# Patient Record
Sex: MALE | Race: BLACK OR AFRICAN AMERICAN | NOT HISPANIC OR LATINO | Employment: UNEMPLOYED | URBAN - NONMETROPOLITAN AREA
[De-identification: names, ages, dates, MRNs, and addresses within clinical notes are randomized per-mention and may not be internally consistent; named-entity substitution may affect disease eponyms.]

---

## 2021-02-25 ENCOUNTER — HOSPITAL ENCOUNTER (OUTPATIENT)
Dept: PULMONOLOGY | Facility: HOSPITAL | Age: 41
Discharge: HOME | End: 2021-02-25
Attending: NURSE PRACTITIONER
Payer: MEDICAID

## 2021-02-25 ENCOUNTER — HOSPITAL ENCOUNTER (OUTPATIENT)
Dept: RADIOLOGY | Facility: HOSPITAL | Age: 41
Discharge: HOME | End: 2021-02-25
Attending: NURSE PRACTITIONER
Payer: MEDICAID

## 2021-02-25 DIAGNOSIS — R10.84 ABDOMINAL PAIN, GENERALIZED: ICD-10-CM

## 2021-02-25 DIAGNOSIS — R10.84 ABDOMINAL PAIN, GENERALIZED: Primary | ICD-10-CM

## 2021-02-25 PROCEDURE — 93005 ELECTROCARDIOGRAM TRACING: CPT

## 2021-02-25 PROCEDURE — 93010 ELECTROCARDIOGRAM REPORT: CPT | Mod: ,,, | Performed by: INTERNAL MEDICINE

## 2021-02-25 PROCEDURE — 74019 RADEX ABDOMEN 2 VIEWS: CPT | Mod: TC

## 2021-02-25 PROCEDURE — 93010 EKG 12-LEAD: ICD-10-PCS | Mod: ,,, | Performed by: INTERNAL MEDICINE

## 2021-04-07 DIAGNOSIS — B16.9 ACUTE FULMINATING TYPE B VIRAL HEPATITIS: Primary | ICD-10-CM

## 2021-04-09 ENCOUNTER — HOSPITAL ENCOUNTER (OUTPATIENT)
Dept: RADIOLOGY | Facility: HOSPITAL | Age: 41
Discharge: HOME | End: 2021-04-09
Attending: NURSE PRACTITIONER
Payer: MEDICAID

## 2021-04-09 DIAGNOSIS — B16.9 ACUTE FULMINATING TYPE B VIRAL HEPATITIS: ICD-10-CM

## 2021-04-09 PROCEDURE — 76700 US EXAM ABDOM COMPLETE: CPT | Mod: TC

## 2021-05-26 PROBLEM — D64.9 ANEMIA: Status: ACTIVE | Noted: 2021-05-26

## 2021-11-01 DIAGNOSIS — G47.8 OTHER SLEEP DISORDERS: Primary | ICD-10-CM

## 2021-11-02 ENCOUNTER — HOSPITAL ENCOUNTER (OUTPATIENT)
Dept: SLEEP MEDICINE | Facility: HOSPITAL | Age: 41
Discharge: HOME | End: 2021-11-02
Attending: NURSE PRACTITIONER
Payer: MEDICAID

## 2021-11-02 DIAGNOSIS — G47.8 OTHER SLEEP DISORDERS: ICD-10-CM

## 2021-11-02 PROCEDURE — 95810 POLYSOM 6/> YRS 4/> PARAM: CPT

## 2021-12-01 ENCOUNTER — HOSPITAL ENCOUNTER (OUTPATIENT)
Dept: SLEEP MEDICINE | Facility: HOSPITAL | Age: 41
Discharge: HOME | End: 2021-12-01
Attending: NURSE PRACTITIONER
Payer: MEDICAID

## 2021-12-01 PROCEDURE — 95811 POLYSOM 6/>YRS CPAP 4/> PARM: CPT

## 2023-09-20 ENCOUNTER — HOSPITAL ENCOUNTER (OUTPATIENT)
Dept: RADIOLOGY | Facility: HOSPITAL | Age: 43
Discharge: HOME | End: 2023-09-20
Attending: NURSE PRACTITIONER
Payer: MEDICAID

## 2023-09-20 DIAGNOSIS — M25.511 RIGHT SHOULDER PAIN: ICD-10-CM

## 2023-09-20 DIAGNOSIS — M25.511 RIGHT SHOULDER PAIN: Primary | ICD-10-CM

## 2023-09-20 DIAGNOSIS — M54.2 CERVICALGIA: ICD-10-CM

## 2023-09-20 PROCEDURE — 73030 X-RAY EXAM OF SHOULDER: CPT | Mod: TC,RT

## 2023-09-20 PROCEDURE — 72050 X-RAY EXAM NECK SPINE 4/5VWS: CPT | Mod: TC

## 2023-10-05 ENCOUNTER — OFFICE VISIT (OUTPATIENT)
Dept: ORTHOPEDICS | Facility: CLINIC | Age: 43
End: 2023-10-05
Payer: MEDICAID

## 2023-10-05 DIAGNOSIS — M25.511 ACUTE PAIN OF RIGHT SHOULDER: Primary | ICD-10-CM

## 2023-10-05 DIAGNOSIS — M67.911 DISORDER OF RIGHT ROTATOR CUFF: ICD-10-CM

## 2023-10-05 DIAGNOSIS — R29.898 WEAKNESS OF SHOULDER: ICD-10-CM

## 2023-10-05 PROCEDURE — 99999 PR PBB SHADOW E&M-EST. PATIENT-LVL II: CPT | Mod: PBBFAC,,, | Performed by: NURSE PRACTITIONER

## 2023-10-05 PROCEDURE — 99212 OFFICE O/P EST SF 10 MIN: CPT | Mod: PBBFAC | Performed by: NURSE PRACTITIONER

## 2023-10-05 PROCEDURE — 1160F PR REVIEW ALL MEDS BY PRESCRIBER/CLIN PHARMACIST DOCUMENTED: ICD-10-PCS | Mod: CPTII,,, | Performed by: NURSE PRACTITIONER

## 2023-10-05 PROCEDURE — 99203 PR OFFICE/OUTPT VISIT, NEW, LEVL III, 30-44 MIN: ICD-10-PCS | Mod: S$PBB,,, | Performed by: NURSE PRACTITIONER

## 2023-10-05 PROCEDURE — 1160F RVW MEDS BY RX/DR IN RCRD: CPT | Mod: CPTII,,, | Performed by: NURSE PRACTITIONER

## 2023-10-05 PROCEDURE — 99203 OFFICE O/P NEW LOW 30 MIN: CPT | Mod: S$PBB,,, | Performed by: NURSE PRACTITIONER

## 2023-10-05 PROCEDURE — 97110 THERAPEUTIC EXERCISES: CPT | Mod: ,,, | Performed by: NURSE PRACTITIONER

## 2023-10-05 PROCEDURE — 1159F MED LIST DOCD IN RCRD: CPT | Mod: CPTII,,, | Performed by: NURSE PRACTITIONER

## 2023-10-05 PROCEDURE — 97110 PR THERAPEUTIC EXERCISES: ICD-10-PCS | Mod: ,,, | Performed by: NURSE PRACTITIONER

## 2023-10-05 PROCEDURE — 99999 PR PBB SHADOW E&M-EST. PATIENT-LVL II: ICD-10-PCS | Mod: PBBFAC,,, | Performed by: NURSE PRACTITIONER

## 2023-10-05 PROCEDURE — 1159F PR MEDICATION LIST DOCUMENTED IN MEDICAL RECORD: ICD-10-PCS | Mod: CPTII,,, | Performed by: NURSE PRACTITIONER
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
## 2023-10-12 ENCOUNTER — HOSPITAL ENCOUNTER (OUTPATIENT)
Dept: RADIOLOGY | Facility: HOSPITAL | Age: 43
Discharge: HOME | End: 2023-10-12
Attending: NURSE PRACTITIONER
Payer: MEDICAID

## 2023-10-12 DIAGNOSIS — M25.511 ACUTE PAIN OF RIGHT SHOULDER: ICD-10-CM

## 2023-10-12 DIAGNOSIS — R29.898 WEAKNESS OF SHOULDER: ICD-10-CM

## 2023-10-12 DIAGNOSIS — M67.911 DISORDER OF RIGHT ROTATOR CUFF: ICD-10-CM

## 2023-10-12 PROCEDURE — 73221 MRI JOINT UPR EXTREM W/O DYE: CPT | Mod: TC,RT

## 2023-10-17 ENCOUNTER — OFFICE VISIT (OUTPATIENT)
Dept: ORTHOPEDICS | Facility: CLINIC | Age: 43
End: 2023-10-17
Payer: MEDICAID

## 2023-10-17 DIAGNOSIS — R29.898 WEAKNESS OF SHOULDER: ICD-10-CM

## 2023-10-17 DIAGNOSIS — M25.511 ACUTE PAIN OF RIGHT SHOULDER: Primary | ICD-10-CM

## 2023-10-17 DIAGNOSIS — M67.911 DISORDER OF RIGHT ROTATOR CUFF: ICD-10-CM

## 2023-10-17 PROCEDURE — 1160F PR REVIEW ALL MEDS BY PRESCRIBER/CLIN PHARMACIST DOCUMENTED: ICD-10-PCS | Mod: CPTII,,, | Performed by: NURSE PRACTITIONER

## 2023-10-17 PROCEDURE — 99999 PR PBB SHADOW E&M-EST. PATIENT-LVL II: ICD-10-PCS | Mod: PBBFAC,,, | Performed by: NURSE PRACTITIONER

## 2023-10-17 PROCEDURE — 99212 OFFICE O/P EST SF 10 MIN: CPT | Mod: PBBFAC | Performed by: NURSE PRACTITIONER

## 2023-10-17 PROCEDURE — 99999 PR PBB SHADOW E&M-EST. PATIENT-LVL II: CPT | Mod: PBBFAC,,, | Performed by: NURSE PRACTITIONER

## 2023-10-17 PROCEDURE — 99213 OFFICE O/P EST LOW 20 MIN: CPT | Mod: S$PBB,,, | Performed by: NURSE PRACTITIONER

## 2023-10-17 PROCEDURE — 99213 PR OFFICE/OUTPT VISIT, EST, LEVL III, 20-29 MIN: ICD-10-PCS | Mod: S$PBB,,, | Performed by: NURSE PRACTITIONER

## 2023-10-17 PROCEDURE — 1160F RVW MEDS BY RX/DR IN RCRD: CPT | Mod: CPTII,,, | Performed by: NURSE PRACTITIONER

## 2023-10-17 PROCEDURE — 1159F PR MEDICATION LIST DOCUMENTED IN MEDICAL RECORD: ICD-10-PCS | Mod: CPTII,,, | Performed by: NURSE PRACTITIONER

## 2023-10-17 PROCEDURE — 1159F MED LIST DOCD IN RCRD: CPT | Mod: CPTII,,, | Performed by: NURSE PRACTITIONER

## 2023-10-17 NOTE — PROGRESS NOTES
Subjective:      Follow up: MRI RT shoulder;    Moreno Berman is a 43 y.o. left handed male who presents for follow up for chronic right shoulder pain, weakness and inability to lift the right arm. He is here for MRI review. Previous injury was on 01/31/22. He presents today and rates his pain as 5/10 and has bouts of upper extremity numbness. He states that he is unable to lift the arm and feels very weak as previous. Symptoms are aggravated by lifting, repetitive use and attempted over head motions. He reports mild right hand grasp weakness.      The following portions of the patient's history were reviewed and updated as appropriate: allergies, current medications, past family history, past medical history, past social history, past surgical history, and problem list.      Review of Systems   Constitutional: Negative.  Negative for fever.   Musculoskeletal:  Positive for joint pain.   Skin: Negative.    Neurological: Negative.    Psychiatric/Behavioral: Anxiety        Objective:   NVI  General:  alert, appears stated age, and cooperative   Gait:  Normal.       Right Shoulder  Bruising:   absent   Crepitus:  absent   Joint Tenderness:  lateral acromial, rotator cuff and deltoid.    Active ROM:   Decreased and painful in all planes.   FF < 60 AROM.   Very limited AROM with IR and ER.   Neer:   positive   Nowak  positive   Speeds negative   Cross arm negative   Empty can positive   Drop arm guarded   Stability:   normal   Apprehension Sign:   negative   Atrophy:   none noted   Strength:  biceps 4/5, triceps 4/5, abduction 3/5, adduction 3/5   Mild RT sided hand grasp weakness noted.   Contralateral shoulder was without deficit.   Brisk refill noted.       Imaging  X-Ray: Reviewed of the right shoulder 09/20/23  No evidence of a fracture or dislocation     MR shoulder RT:   Impression:     Partial-thickness tear of the supraspinatus tendon at the humeral head without tendon retraction or muscle atrophy  detected.     Assessment:      RT shoulder pain, partial tear supraspinatus  RT upper extremity weakness     Plan:      MRI RT Shoulder was reviewed, consistent with a partial-thickness tear of the supraspinatus tendon   Continue Home physician directed RT shoulder HEP and referral to OT for modalities as directed.   Mobic as previous.   Ice and over head rest as directed.  RTC 6 weeks for follow up. Will make referral to sports medicine if needed. Also will make referral to neurology if any numbness in the RT UE persists.  He had no further questions.

## 2023-10-23 ENCOUNTER — TELEPHONE (OUTPATIENT)
Dept: REHABILITATION | Facility: HOSPITAL | Age: 43
End: 2023-10-23
Payer: MEDICAID

## 2023-10-24 ENCOUNTER — CLINICAL SUPPORT (OUTPATIENT)
Dept: REHABILITATION | Facility: HOSPITAL | Age: 43
End: 2023-10-24
Attending: NURSE PRACTITIONER
Payer: MEDICAID

## 2023-10-24 DIAGNOSIS — M25.511 ACUTE PAIN OF RIGHT SHOULDER: ICD-10-CM

## 2023-10-24 DIAGNOSIS — M67.911 DISORDER OF RIGHT ROTATOR CUFF: ICD-10-CM

## 2023-10-24 DIAGNOSIS — R29.898 WEAKNESS OF SHOULDER: ICD-10-CM

## 2023-10-24 PROCEDURE — 97166 OT EVAL MOD COMPLEX 45 MIN: CPT

## 2023-10-24 PROCEDURE — 97530 THERAPEUTIC ACTIVITIES: CPT
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
## 2023-10-25 ENCOUNTER — CLINICAL SUPPORT (OUTPATIENT)
Dept: REHABILITATION | Facility: HOSPITAL | Age: 43
End: 2023-10-25
Payer: MEDICAID

## 2023-10-25 DIAGNOSIS — M25.511 ACUTE PAIN OF RIGHT SHOULDER: Primary | ICD-10-CM

## 2023-10-25 DIAGNOSIS — M67.911 DISORDER OF RIGHT ROTATOR CUFF: ICD-10-CM

## 2023-10-25 DIAGNOSIS — R29.898 WEAKNESS OF SHOULDER: ICD-10-CM

## 2023-10-25 PROCEDURE — 97530 THERAPEUTIC ACTIVITIES: CPT

## 2023-10-25 NOTE — PROGRESS NOTES
Occupational Therapy Daily Treatment Note     Date: 10/25/2023  Name: Moreno Berman  MRN: [id number]    Diagnosis:   Encounter Diagnoses   Name Primary?    Acute pain of right shoulder Yes    Disorder of right rotator cuff     Weakness of shoulder      Referring Physician: Akin Adams NP    Evaluation Date: 10/24/2023  Plan of Care Certification Period: 10/24/2023 - 12/8/2023    Visit # / Visits authorized: 1 / 12  GALAVIZ visit number: 0  Time In: 10:00  Time Out: 10:53  Total Billable Time: 53 minutes    Precautions:  Standard      Subjective     Pt reports: I'm hurting today  he was compliant with home exercise program given last session.     Pain: 8/10  Location: right shoulder      Objective     Treatment consist of the following:     Moreno received the following supervised modalities after being cleared for contradictions:   -cp post session to decrease pain     Moreno participated in dynamic functional therapeutic activities to improve functional performance, including:  -Patient engaged in OH pulley in shoulder flexion and abduction ex to increase ROM in bilateral shoulders for increase indep with dressing, grooming, and bathing ADL tasks.     -ball roll outs    -Patient completed 2 sets x 10 reps performing bilateral lateral, horizonal, and circumduction towel slides on tabletop seated to improve strength, endurance, and fx mobility for increase indep while completing ADL/IADL tasks.     -scalene stretches x 5 with 10 sec holds for possible nerve entrapment     Home Exercises and Education Provided     Education provided:   - Role of OT and OT POC  - Progress towards goals     Written Home Exercises Provided: Patient instructed to cont prior HEP.  Exercises were reviewed and Moreno was able to demonstrate them prior to the end of the session.  Moreno demonstrated good  understanding of the HEP provided.   .   See EMR under Media for exercises provided 10/24/2023.        Assessment     Pt would continue to  benefit from skilled OT. Pt tolerated therapy well this day with mod complaints of increased pain during stretching     Moreno is progressing well towards his goals and there are no updates to goals at this time. Pt prognosis is Good.     Pt will continue to benefit from skilled OT intervention.    Patient continues to demonstrate limitation  with  ROM, Joint mobility, Stiffness, Decreased functional use, Impaired sensation, Decreased strength, and Continued pain     Goals:  Goals to be met in 6 weeks:  1) Pt will be independent and report performing HEP to maximize (R) shoulder functional capacity.   2) Pt will increase shoulder strength in all measured planes of motion to 4/5 with daily task.  3) Pt will report use of home modalities for pain management.  4) Pt will report one degree less of pain with nonuse.  5) Pt will increase ability to move/handle objects with an increased score on FOTO >=67 (currently 55)  6) Pt will increase shoulder AROM in all measured planes of motion by 5-10 degrees with daily task.   7) Pt will report increased independence in ADLs.       Plan     Moreno to be treated by Occupational Therapy 2 times per week for 6 weeks to achieve the established goals.   Treatment to include Ultrasoud @ 3mHz, AAROM Mobilization of GH joint, PROM Home program, Ice, Moist heat, Strengthening Theraband Ex, UBE , and E- stim as well as any other treatments deemed necessary based on the patient's needs or progress.      Certification Dates: 10/24/2023 - 12/8/2023    Updates/Grading for next session: N/A      Curtis Bravo OTR/L

## 2023-11-01 ENCOUNTER — CLINICAL SUPPORT (OUTPATIENT)
Dept: REHABILITATION | Facility: HOSPITAL | Age: 43
End: 2023-11-01
Payer: MEDICAID

## 2023-11-01 DIAGNOSIS — R29.898 WEAKNESS OF SHOULDER: ICD-10-CM

## 2023-11-01 DIAGNOSIS — M25.511 ACUTE PAIN OF RIGHT SHOULDER: Primary | ICD-10-CM

## 2023-11-01 DIAGNOSIS — M67.911 DISORDER OF RIGHT ROTATOR CUFF: ICD-10-CM

## 2023-11-01 PROCEDURE — 97530 THERAPEUTIC ACTIVITIES: CPT | Mod: CO

## 2023-11-01 NOTE — PROGRESS NOTES
Occupational Therapy Daily Treatment Note     Date: 11/1/2023  Name: Moreno Berman  MRN: [id number]    Diagnosis:   Encounter Diagnoses   Name Primary?    Acute pain of right shoulder Yes    Disorder of right rotator cuff     Weakness of shoulder      Referring Physician: Akin Adams NP    Evaluation Date: 10/24/2023  Plan of Care Certification Period: 10/24/2023 - 12/8/2023    Visit # / Visits authorized: 2 / 12  GALAVIZ visit number: 1  Time In: 10:00 AM  Time Out: 10:57 AM   Total Billable Time: 57  Precautions:  Standard      Subjective     Pt reports: Its hurting today.  he was compliant with home exercise program given last session.     Pain: 9/10  Location: right shoulder      Objective     Treatment consist of the following:     Moreno received the following supervised modalities after being cleared for contradictions:   -cp post session to decrease pain (not today)    Moreno participated in dynamic functional therapeutic activities to improve functional performance, including:  -Patient engaged in OH pulley in shoulder flexion and scaption exercise x 2 min each way to increase ROM in bilateral shoulders for increase indep with dressing, grooming, and bathing ADL tasks.     -Patient completed 2 sets x 10 reps performing shoulder flexion, scaption, and abduction towel slides on tabletop seated to improve strength, endurance, and fx mobility for increase indep while completing ADL/IADL tasks.     - Pendulums in shoulder flexion and abduction x 1 min each way to increase blood flow to hand to decrease numbness.     Home Exercises and Education Provided     Education provided:   - Role of OT and OT POC  - Progress towards goals     Written Home Exercises Provided: Patient instructed to cont prior HEP.  Exercises were reviewed and Moreno was able to demonstrate them prior to the end of the session.  Moreno demonstrated good  understanding of the HEP provided.   .   See EMR under Media for exercises provided  10/24/2023.        Assessment     Patient would continue to benefit from skilled occupational therapy to increase pain free range of motion. Patient reported significant amount pain prior to session. Session this day worked on decreasing pain. Patient also reported RUE was numb t/o session. Patient reported numbness decreased while completing pendulums. Patient reported he did not want cryotherapy post session this day. Patient reported he has been compliant with home exercise program. Patient reported pain was 7 /10 post session. Occupational therapist /GALAVIZ collaboration to discuss POC, improvements, and d/c planning on todays date.      Moreno is progressing well towards his goals and there are no updates to goals at this time. Pt prognosis is Good.     Pt will continue to benefit from skilled OT intervention.    Patient continues to demonstrate limitation  with  ROM, Joint mobility, Stiffness, Decreased functional use, Impaired sensation, Decreased strength, and Continued pain     Goals:  Goals to be met in 6 weeks:  1) Pt will be independent and report performing HEP to maximize (R) shoulder functional capacity.   2) Pt will increase shoulder strength in all measured planes of motion to 4/5 with daily task.  3) Pt will report use of home modalities for pain management.  4) Pt will report one degree less of pain with nonuse.  5) Pt will increase ability to move/handle objects with an increased score on FOTO >=67 (currently 55)  6) Pt will increase shoulder AROM in all measured planes of motion by 5-10 degrees with daily task.   7) Pt will report increased independence in ADLs.       Plan     Moreno to be treated by Occupational Therapy 2 times per week for 6 weeks to achieve the established goals.   Treatment to include Ultrasoud @ 3mHz, AAROM Mobilization of GH joint, PROM Home program, Ice, Moist heat, Strengthening Theraband Ex, UBE , and E- stim as well as any other treatments deemed necessary based on the  patient's needs or progress.      Certification Dates: 10/24/2023 - 12/8/2023    Updates/Grading for next session: N/A      BETH Wheat

## 2023-11-03 ENCOUNTER — CLINICAL SUPPORT (OUTPATIENT)
Dept: REHABILITATION | Facility: HOSPITAL | Age: 43
End: 2023-11-03
Payer: MEDICAID

## 2023-11-03 DIAGNOSIS — M25.511 ACUTE PAIN OF RIGHT SHOULDER: Primary | ICD-10-CM

## 2023-11-03 PROCEDURE — 97530 THERAPEUTIC ACTIVITIES: CPT

## 2023-11-03 NOTE — PROGRESS NOTES
Occupational Therapy Daily Treatment Note     Date: 11/3/2023  Name: Moreno Berman  MRN: [id number]    Diagnosis:   Encounter Diagnosis   Name Primary?    Acute pain of right shoulder Yes       Referring Physician: Akin Adams NP    Evaluation Date: 10/24/2023  Plan of Care Certification Period: 10/24/2023 - 12/8/2023    Visit # / Visits authorized: 3 / 12  GAL[facility] visit number: 0  Time In: 10:00 AM  Time Out: 11:00 AM   Total Billable Time: 60  Precautions:  Standard      Subjective     Pt reports: Cherry smith.  he was compliant with home exercise program given last session.     Pain: 5/10  Location: right shoulder      Objective     Treatment consist of the following:     Moreno received the following supervised modalities after being cleared for contradictions:   -cp post session to decrease pain     Moreno participated in dynamic functional therapeutic activities to improve functional performance, including:  -Patient engaged in OH pulley in shoulder flexion and scaption exercise x 2 min each way to increase ROM in bilateral shoulders for increase indep with dressing, grooming, and bathing ADL tasks.     -Patient performed bilateral upper body ergonomic exercise for 1 sets x 10 min with min rest breaks to facilitate an increase in strength, functional mobility, range of motion, and endurance for increased indep, cardiopulmonary functions, and performance with activities of daily living.       -Patient completed 2 sets x 10 reps performing shoulder flexion, scaption, and abduction towel slides on tabletop seated to improve strength, endurance, and fx mobility for increase indep while completing ADL/IADL tasks.     - Pendulums in shoulder flexion and abduction x 1 min each way to increase blood flow to hand to decrease numbness.     -Patient completed 2 sets x 10 reps performing finger ladder in shoulder flexion and abduction on incline slope to improve strength, range of motion, endurance, and functional  mobility for increase indep while completing ADL/IADL tasks.      Home Exercises and Education Provided     Education provided:   - Role of OT and OT POC  - Progress towards goals     Written Home Exercises Provided: Patient instructed to cont prior HEP.  Exercises were reviewed and Moreno was able to demonstrate them prior to the end of the session.  Moreno demonstrated good  understanding of the HEP provided.   .   See EMR under Media for exercises provided 10/24/2023.        Assessment     Patient would continue to benefit from skilled occupational therapy to increase pain free range of motion. Pt was able to complete all new ax this day with min rest breaks and min v/c for proper form and positioning. All ax this day focused on improving ROM and strength in shoulder. Patient reported he has been compliant with home exercise program. Pt with decreased pain post cp. Occupational therapist /GAL[facility] collaboration to discuss POC, improvements, and d/c planning on todays date.      Moreno is progressing well towards his goals and there are no updates to goals at this time. Pt prognosis is Good.     Pt will continue to benefit from skilled OT intervention.    Patient continues to demonstrate limitation  with  ROM, Joint mobility, Stiffness, Decreased functional use, Impaired sensation, Decreased strength, and Continued pain     Goals:  Goals to be met in 6 weeks:  1) Pt will be independent and report performing HEP to maximize (R) shoulder functional capacity.   2) Pt will increase shoulder strength in all measured planes of motion to 4/5 with daily task.  3) Pt will report use of home modalities for pain management.  4) Pt will report one degree less of pain with nonuse.  5) Pt will increase ability to move/handle objects with an increased score on FOTO >=67 (currently 55)  6) Pt will increase shoulder AROM in all measured planes of motion by 5-10 degrees with daily task.   7) Pt will report increased independence in ADLs.        Plan     Moreno to be treated by Occupational Therapy 2 times per week for 6 weeks to achieve the established goals.   Treatment to include Ultrasoud @ 3mHz, AAROM Mobilization of GH joint, PROM Home program, Ice, Moist heat, Strengthening Theraband Ex, UBE , and E- stim as well as any other treatments deemed necessary based on the patient's needs or progress.      Certification Dates: 10/24/2023 - 12/8/2023    Updates/Grading for next session: N/A      Curtis Bravo OT

## 2023-11-08 ENCOUNTER — CLINICAL SUPPORT (OUTPATIENT)
Dept: REHABILITATION | Facility: HOSPITAL | Age: 43
End: 2023-11-08
Payer: MEDICAID

## 2023-11-08 DIAGNOSIS — M67.911 DISORDER OF RIGHT ROTATOR CUFF: ICD-10-CM

## 2023-11-08 DIAGNOSIS — M25.511 ACUTE PAIN OF RIGHT SHOULDER: Primary | ICD-10-CM

## 2023-11-08 PROCEDURE — 97530 THERAPEUTIC ACTIVITIES: CPT | Mod: CO

## 2023-11-08 NOTE — PROGRESS NOTES
Occupational Therapy Daily Treatment Note     Date: 11/8/2023  Name: Moreno Berman  MRN: [id number]    Diagnosis:   Encounter Diagnoses   Name Primary?    Acute pain of right shoulder Yes    Disorder of right rotator cuff        Referring Physician: Akin Adams NP    Evaluation Date: 10/24/2023  Plan of Care Certification Period: 10/24/2023 - 12/8/2023    Visit # / Visits authorized: 4 / 12  GAL[facility] visit number: 1  Time In: 10:00 AM  Time Out: 10:58 AM   Total Billable Time: 58 min  Precautions:  Standard      Subjective     Pt reports: Im making it.  he was compliant with home exercise program given last session.     Pain: 7/10  Location: right shoulder      Objective     Treatment consist of the following:     Moreno received the following supervised modalities after being cleared for contradictions:   - Applied MHP to (L) shoulder to increase muscle elasticity and improve blood flow prior to beginning PROM and therapeutic interventions     Moreno participated in dynamic functional therapeutic activities to improve functional performance, including:  - Patient engaged in bilateral hand gripper ex with mod resistance for 2 sets x 30 reps seated to improve overall hand strength while completing ADL/IADL tasks. Patient needed rest breaks b/w sets.  Patient completed while tolerating MHP.     - Patient engaged in bilateral upper extrimity exercise completing 2 sets x 15 reps of scapular ret/protraction, wrist flex/extension, and supination/pronation using green Flexbar with seated to faciliate an increase in indep while completing activity of daily living tasks. Patient needed seated rest breaks b/w each set.  Patient completed while tolerating MHP.     -Patient engaged in OH pulley in shoulder flexion and scaption exercise x 2 min each way to increase ROM in bilateral shoulders for increase indep with dressing, grooming, and bathing ADL tasks.     -Patient performed bilateral upper body ergonomic exercise for 1  sets x 8 min with min rest breaks to facilitate an increase in strength, functional mobility, range of motion, and endurance for increased indep, cardiopulmonary functions, and performance with activities of daily living.     -Patient completed 2 sets x 10 reps performing shoulder flexion, scaption, and abduction towel slides on tabletop seated to improve strength, endurance, and fx mobility for increase indep while completing ADL/IADL tasks.     - Pendulums in shoulder flexion and abduction x 1 min each way to increase blood flow to hand to decrease numbness.     -Patient completed 2 sets x 10 reps performing finger ladder in shoulder flexion and abduction on incline slope to improve strength, range of motion, endurance, and functional mobility for increase indep while completing ADL/IADL tasks.      Home Exercises and Education Provided     Education provided:   - Role of OT and OT POC  - Progress towards goals     Written Home Exercises Provided: Patient instructed to cont prior HEP.  Exercises were reviewed and Moreno was able to demonstrate them prior to the end of the session.  Moreno demonstrated good  understanding of the HEP provided.   .   See EMR under Media for exercises provided 10/24/2023.        Assessment     Patient would continue to benefit from skilled occupational therapy to increase pain free range of motion. Patient reported more tingling and numbness pain today in hand not shoulder. Patient continued to report decrease pain when completing pendulums. MHP applied prior to session while completing therapeutic exercise to assist with decrease pain and lossen musculature. Patient requested Biofreeze post session to decrease pain and soreness post session. Occupational therapist /[facility] collaboration to discuss POC, improvements, and d/c planning on todays date.      Moreno is progressing well towards his goals and there are no updates to goals at this time. Pt prognosis is Good.     Pt will continue to  benefit from skilled OT intervention.  Patient continues to demonstrate limitation  with  ROM, Joint mobility, Stiffness, Decreased functional use, Impaired sensation, Decreased strength, and Continued pain     Goals:  Goals to be met in 6 weeks:  1) Pt will be independent and report performing HEP to maximize (R) shoulder functional capacity.   2) Pt will increase shoulder strength in all measured planes of motion to 4/5 with daily task.  3) Pt will report use of home modalities for pain management.  4) Pt will report one degree less of pain with nonuse.  5) Pt will increase ability to move/handle objects with an increased score on FOTO >=67 (currently 55)  6) Pt will increase shoulder AROM in all measured planes of motion by 5-10 degrees with daily task.   7) Pt will report increased independence in ADLs.       Plan     Moreno to be treated by Occupational Therapy 2 times per week for 6 weeks to achieve the established goals.   Treatment to include Ultrasoud @ 3mHz, AAROM Mobilization of GH joint, PROM Home program, Ice, Moist heat, Strengthening Theraband Ex, UBE , and E- stim as well as any other treatments deemed necessary based on the patient's needs or progress.      Certification Dates: 10/24/2023 - 12/8/2023    Updates/Grading for next session: N/A      BETH Wheat

## 2023-11-10 ENCOUNTER — CLINICAL SUPPORT (OUTPATIENT)
Dept: REHABILITATION | Facility: HOSPITAL | Age: 43
End: 2023-11-10
Payer: MEDICAID

## 2023-11-10 DIAGNOSIS — M25.511 ACUTE PAIN OF RIGHT SHOULDER: Primary | ICD-10-CM

## 2023-11-10 PROCEDURE — 97530 THERAPEUTIC ACTIVITIES: CPT | Mod: CO

## 2023-11-10 NOTE — PROGRESS NOTES
Occupational Therapy Daily Treatment Note     Date: 11/10/2023  Name: Moreno Berman  MRN: [id number]    Diagnosis:   Encounter Diagnosis   Name Primary?    Acute pain of right shoulder Yes       Referring Physician: Akin Adams NP    Evaluation Date: 10/24/2023  Plan of Care Certification Period: 10/24/2023 - 12/8/2023    Visit # / Visits authorized: 4 / 12  GAL[facility] visit number: 1  Time In: 9:55 AM  Time Out: 10:55 AM   Total Billable Time: 60 min  Precautions:  Standard      Subjective     Pt reports: Its staying numb.   he was compliant with home exercise program given last session.     Pain: 0/10  Location: right shoulder      Objective     Treatment consist of the following:     Moreno received the following supervised modalities after being cleared for contradictions:   - Biofreeze post session to decrease pain and soreness post session.     Moreno participated in dynamic functional therapeutic activities to improve functional performance, including:  - Patient engaged in bilateral hand gripper ex with mod resistance for 2 sets x 30 reps seated to improve overall hand strength while completing ADL/IADL tasks. Patient needed rest breaks b/w sets.  Patient completed while tolerating MHP.     - Patient engaged in bilateral upper extrimity exercise completing 2 sets x 15 reps of scapular ret/protraction, wrist flex/extension, and supination/pronation using green Flexbar with seated to faciliate an increase in indep while completing activity of daily living tasks. Patient needed seated rest breaks b/w each set.  Patient completed while tolerating MHP.     -Patient engaged in OH pulley in shoulder flexion and scaption exercise x 2 min each way to increase ROM in bilateral shoulders for increase indep with dressing, grooming, and bathing ADL tasks.     -Patient performed bilateral upper body ergonomic exercise for 1 sets x 8 min with min rest breaks to facilitate an increase in strength, functional mobility,  range of motion, and endurance for increased indep, cardiopulmonary functions, and performance with activities of daily living.     -Patient completed 2 sets x 10 reps performing shoulder flexion, scaption, and abduction towel slides on tabletop seated to improve strength, endurance, and fx mobility for increase indep while completing ADL/IADL tasks.     - Pendulums in shoulder flexion and abduction x 1 min each way to increase blood flow to hand to decrease numbness.     -Patient completed 2 sets x 10 reps performing finger ladder in shoulder flexion and abduction on incline slope to improve strength, range of motion, endurance, and functional mobility for increase indep while completing ADL/IADL tasks.      Home Exercises and Education Provided     Education provided:   - Role of OT and OT POC  - Progress towards goals     Written Home Exercises Provided: Patient instructed to cont prior HEP.  Exercises were reviewed and Moreno was able to demonstrate them prior to the end of the session.  Moreno demonstrated good  understanding of the HEP provided.   .   See EMR under Media for exercises provided 10/24/2023.        Assessment     Patient would continue to benefit from skilled occupational therapy to increase pain free range of motion and decrease numbness and tingling. Patient reported no pain this day just numbness and tingling. Patient educated on way to decrease numbness at home and with IADLs. Patient requested Biofreeze post session to decrease pain and soreness post session. Occupational therapist /[facility] collaboration to discuss POC, improvements, and d/c planning on todays date.      Moreno is progressing well towards his goals and there are no updates to goals at this time. Pt prognosis is Good.     Pt will continue to benefit from skilled OT intervention.  Patient continues to demonstrate limitation  with  ROM, Joint mobility, Stiffness, Decreased functional use, Impaired sensation, Decreased strength, and  Continued pain     Goals:  Goals to be met in 6 weeks:  1) Pt will be independent and report performing HEP to maximize (R) shoulder functional capacity.   2) Pt will increase shoulder strength in all measured planes of motion to 4/5 with daily task.  3) Pt will report use of home modalities for pain management.  4) Pt will report one degree less of pain with nonuse.  5) Pt will increase ability to move/handle objects with an increased score on FOTO >=67 (currently 55)  6) Pt will increase shoulder AROM in all measured planes of motion by 5-10 degrees with daily task.   7) Pt will report increased independence in ADLs.       Plan     Moreno to be treated by Occupational Therapy 2 times per week for 6 weeks to achieve the established goals.   Treatment to include Ultrasoud @ 3mHz, AAROM Mobilization of GH joint, PROM Home program, Ice, Moist heat, Strengthening Theraband Ex, UBE , and E- stim as well as any other treatments deemed necessary based on the patient's needs or progress.      Certification Dates: 10/24/2023 - 12/8/2023    Updates/Grading for next session: N/A      ANASTACIA Wheat/IAIN

## 2023-11-15 ENCOUNTER — CLINICAL SUPPORT (OUTPATIENT)
Dept: REHABILITATION | Facility: HOSPITAL | Age: 43
End: 2023-11-15
Payer: MEDICAID

## 2023-11-15 DIAGNOSIS — M25.511 ACUTE PAIN OF RIGHT SHOULDER: Primary | ICD-10-CM

## 2023-11-15 PROCEDURE — 97530 THERAPEUTIC ACTIVITIES: CPT

## 2023-11-15 NOTE — PROGRESS NOTES
Occupational Therapy Daily Treatment Note     Date: 11/15/2023  Name: Moreno Berman  MRN: [id number]    Diagnosis:   Encounter Diagnosis   Name Primary?    Acute pain of right shoulder Yes         Referring Physician: Akin Adams NP    Evaluation Date: 10/24/2023  Plan of Care Certification Period: 10/24/2023 - 12/8/2023    Visit # / Visits authorized: 6 / 12  GAL[facility] visit number: 0  Time In: 10:00 AM  Time Out: 10:58 AM   Total Billable Time: 58 min  Precautions:  Standard      Subjective     Pt reports: I'm ok.   he was compliant with home exercise program given last session.     Pain: 0/10  Location: right shoulder      Objective     Treatment consist of the following:     Moreno received the following supervised modalities after being cleared for contradictions:   - Biofreeze post session to decrease pain and soreness post session.     Moreno participated in dynamic functional therapeutic activities to improve functional performance, including:  - Patient engaged in bilateral hand gripper ex with mod resistance for 2 sets x 30 reps seated to improve overall hand strength while completing ADL/IADL tasks. Patient needed rest breaks b/w sets.  Patient completed while tolerating MHP.     - Patient engaged in bilateral upper extrimity exercise completing 2 sets x 15 reps of scapular ret/protraction, wrist flex/extension, and supination/pronation using green Flexbar with seated to faciliate an increase in indep while completing activity of daily living tasks. Patient needed seated rest breaks b/w each set.  Patient completed while tolerating MHP.     -Patient engaged in OH pulley in shoulder flexion and scaption exercise x 2 min each way to increase ROM in bilateral shoulders for increase indep with dressing, grooming, and bathing ADL tasks.     -Patient performed bilateral upper body ergonomic exercise for 1 sets x 8 min with min rest breaks to facilitate an increase in strength, functional mobility, range of  motion, and endurance for increased indep, cardiopulmonary functions, and performance with activities of daily living.     -Patient completed 2 sets x 10 reps performing shoulder flexion, scaption, and abduction towel slides on tabletop seated to improve strength, endurance, and fx mobility for increase indep while completing ADL/IADL tasks.     - Pendulums in shoulder flexion and abduction x 1 min each way to increase blood flow to hand to decrease numbness.     -Patient completed 2 sets x 10 reps performing finger ladder in shoulder flexion and abduction on incline slope to improve strength, range of motion, endurance, and functional mobility for increase indep while completing ADL/IADL tasks.      Home Exercises and Education Provided     Education provided:   - Role of OT and OT POC  - Progress towards goals     Written Home Exercises Provided: Patient instructed to cont prior HEP.  Exercises were reviewed and Moreno was able to demonstrate them prior to the end of the session.  Moreno demonstrated good  understanding of the HEP provided.   .   See EMR under Media for exercises provided 10/24/2023.        Assessment     Patient would continue to benefit from skilled occupational therapy to increase pain free range of motion and decrease numbness and tingling. Patient reported no pain this day just numbness and tingling. Patient stated that he seen his PCP about his numbness. Patient requested Biofreeze post session to decrease pain and soreness post session. Occupational therapist /[facility] collaboration to discuss POC, improvements, and d/c planning on todays date.      Moreno is progressing well towards his goals and there are no updates to goals at this time. Pt prognosis is Good.     Pt will continue to benefit from skilled OT intervention.  Patient continues to demonstrate limitation  with  ROM, Joint mobility, Stiffness, Decreased functional use, Impaired sensation, Decreased strength, and Continued pain      Goals:  Goals to be met in 6 weeks:  1) Pt will be independent and report performing HEP to maximize (R) shoulder functional capacity.   2) Pt will increase shoulder strength in all measured planes of motion to 4/5 with daily task.  3) Pt will report use of home modalities for pain management.  4) Pt will report one degree less of pain with nonuse.  5) Pt will increase ability to move/handle objects with an increased score on FOTO >=67 (currently 55)  6) Pt will increase shoulder AROM in all measured planes of motion by 5-10 degrees with daily task.   7) Pt will report increased independence in ADLs.       Plan     Moreno to be treated by Occupational Therapy 2 times per week for 6 weeks to achieve the established goals.   Treatment to include Ultrasoud @ 3mHz, AAROM Mobilization of GH joint, PROM Home program, Ice, Moist heat, Strengthening Theraband Ex, UBE , and E- stim as well as any other treatments deemed necessary based on the patient's needs or progress.      Certification Dates: 10/24/2023 - 12/8/2023    Updates/Grading for next session: N/A      Curtis Bravo OT

## 2023-11-17 ENCOUNTER — CLINICAL SUPPORT (OUTPATIENT)
Dept: REHABILITATION | Facility: HOSPITAL | Age: 43
End: 2023-11-17
Payer: MEDICAID

## 2023-11-17 DIAGNOSIS — M67.911 DISORDER OF RIGHT ROTATOR CUFF: ICD-10-CM

## 2023-11-17 DIAGNOSIS — M25.511 ACUTE PAIN OF RIGHT SHOULDER: Primary | ICD-10-CM

## 2023-11-17 PROCEDURE — 97530 THERAPEUTIC ACTIVITIES: CPT | Mod: CO

## 2023-11-17 NOTE — PROGRESS NOTES
"  Occupational Therapy Daily Treatment Note     Date: 11/17/2023  Name: Moreno Berman  MRN: [id number]    Diagnosis:   Encounter Diagnoses   Name Primary?    Acute pain of right shoulder Yes    Disorder of right rotator cuff          Referring Physician: Akin Adams NP    Evaluation Date: 10/24/2023  Plan of Care Certification Period: 10/24/2023 - 12/8/2023    Visit # / Visits authorized: 6 / 12  GAL[facility] visit number: 0  Time In: 10:00 AM  Time Out: 10:56 AM   Total Billable Time: 56 min  Precautions:  Standard      Subjective     Pt reports: "Just a little bit of pain."   he was compliant with home exercise program given last session.     Pain: 7/10  Location: right shoulder      Objective     Treatment consist of the following:     Moreno received the following supervised modalities after being cleared for contradictions:   - Biofreeze post session to decrease pain and soreness post session.     Moreno participated in dynamic functional therapeutic activities to improve functional performance, including:  - Patient engaged in bilateral hand gripper ex with mod resistance for 2 sets x 30 reps seated to improve overall hand strength while completing ADL/IADL tasks. Patient needed rest breaks b/w sets.  Patient completed while tolerating MHP.     - Patient engaged in bilateral upper extrimity exercise completing 2 sets x 15 reps of scapular ret/protraction, wrist flex/extension, and supination/pronation using green Flexbar with seated to faciliate an increase in indep while completing activity of daily living tasks. Patient needed seated rest breaks b/w each set.  Patient completed while tolerating MHP.     -Patient engaged in OH pulley in shoulder flexion and scaption exercise x 3 min each way to increase ROM in bilateral shoulders for increase indep with dressing, grooming, and bathing ADL tasks.     -Patient performed bilateral upper body ergonomic exercise for 1 sets x 8 min with min rest breaks to facilitate " an increase in strength, functional mobility, range of motion, and endurance for increased indep, cardiopulmonary functions, and performance with activities of daily living.     -Patient completed 2 sets x 10 reps performing shoulder flexion, scaption, and abduction towel slides on tabletop seated to improve strength, endurance, and fx mobility for increase indep while completing ADL/IADL tasks.     - Pendulums in shoulder flexion and abduction x 1 min each way to increase blood flow to hand to decrease numbness.     -Patient completed 2 sets x 15 reps performing finger ladder in shoulder flexion and abduction on incline slope to improve strength, range of motion, endurance, and functional mobility for increase indep while completing ADL/IADL tasks.      Home Exercises and Education Provided     Education provided:   - Role of OT and OT POC  - Progress towards goals     Written Home Exercises Provided: Patient instructed to cont prior HEP.  Exercises were reviewed and Moreno was able to demonstrate them prior to the end of the session.  Moreno demonstrated good  understanding of the HEP provided.   .   See EMR under Media for exercises provided 10/24/2023.        Assessment     Patient would continue to benefit from skilled occupational therapy to increase pain free range of motion and decrease numbness and tingling. Patient reported less pain this day just numbness and tingling. Patient reported pain stayed about at a 7.  Occupational therapist /GALAVIZ collaboration to discuss POC, improvements, and d/c planning on todays date.      Moreno is progressing well towards his goals and there are no updates to goals at this time. Pt prognosis is Good.     Pt will continue to benefit from skilled OT intervention.  Patient continues to demonstrate limitation  with  ROM, Joint mobility, Stiffness, Decreased functional use, Impaired sensation, Decreased strength, and Continued pain     Goals:  Goals to be met in 6 weeks:  1) Pt will  be independent and report performing HEP to maximize (R) shoulder functional capacity.   2) Pt will increase shoulder strength in all measured planes of motion to 4/5 with daily task.  3) Pt will report use of home modalities for pain management.  4) Pt will report one degree less of pain with nonuse.  5) Pt will increase ability to move/handle objects with an increased score on FOTO >=67 (currently 55)  6) Pt will increase shoulder AROM in all measured planes of motion by 5-10 degrees with daily task.   7) Pt will report increased independence in ADLs.       Plan     Moreno to be treated by Occupational Therapy 2 times per week for 6 weeks to achieve the established goals.   Treatment to include Ultrasoud @ 3mHz, AAROM Mobilization of GH joint, PROM Home program, Ice, Moist heat, Strengthening Theraband Ex, UBE , and E- stim as well as any other treatments deemed necessary based on the patient's needs or progress.      Certification Dates: 10/24/2023 - 12/8/2023    Updates/Grading for next session: N/A      BETH Wheat

## 2023-11-22 ENCOUNTER — CLINICAL SUPPORT (OUTPATIENT)
Dept: REHABILITATION | Facility: HOSPITAL | Age: 43
End: 2023-11-22
Payer: MEDICAID

## 2023-11-22 DIAGNOSIS — R29.898 WEAKNESS OF SHOULDER: ICD-10-CM

## 2023-11-22 DIAGNOSIS — M67.911 DISORDER OF RIGHT ROTATOR CUFF: ICD-10-CM

## 2023-11-22 DIAGNOSIS — M25.511 ACUTE PAIN OF RIGHT SHOULDER: Primary | ICD-10-CM

## 2023-11-22 PROCEDURE — 97530 THERAPEUTIC ACTIVITIES: CPT

## 2023-11-22 NOTE — PROGRESS NOTES
"  Occupational Therapy Daily Treatment Note     Date: 11/22/2023  Name: Moreno Berman  MRN: [id number]    Diagnosis:   Encounter Diagnoses   Name Primary?    Acute pain of right shoulder Yes    Disorder of right rotator cuff     Weakness of shoulder        Referring Physician: Akin Adams NP    Evaluation Date: 10/24/2023  Plan of Care Certification Period: 10/24/2023 - 12/8/2023    Visit # / Visits authorized: 8 / 12  GALAVIZ visit number: 0  Time In: 10:00 AM  Time Out: 10:54 AM   Total Billable Time: 54 min  Precautions:  Standard      Subjective     Pt reports: "I'm not bad today."   he was compliant with home exercise program given last session.     Pain: 3/10  Location: right shoulder      Objective     Treatment consist of the following:     Moreno received the following supervised modalities after being cleared for contradictions:   - Biofreeze post session to decrease pain and soreness post session.     Moreno participated in dynamic functional therapeutic activities to improve functional performance, including:  - Patient engaged in bilateral hand gripper ex with mod resistance for 2 sets x 30 reps seated to improve overall hand strength while completing ADL/IADL tasks. Patient needed rest breaks b/w sets.  Patient completed while tolerating MHP.     - Patient engaged in bilateral upper extrimity exercise completing 2 sets x 15 reps of scapular ret/protraction, wrist flex/extension, and supination/pronation using green Flexbar with seated to faciliate an increase in indep while completing activity of daily living tasks. Patient needed seated rest breaks b/w each set.  Patient completed while tolerating MHP.     -Patient engaged in OH pulley in shoulder flexion and scaption exercise x 3 min each way to increase ROM in bilateral shoulders for increase indep with dressing, grooming, and bathing ADL tasks.     -Patient performed bilateral upper body ergonomic exercise for 1 sets x 8 min with min rest breaks " to facilitate an increase in strength, functional mobility, range of motion, and endurance for increased indep, cardiopulmonary functions, and performance with activities of daily living.     -Patient completed 2 sets x 10 reps performing shoulder flexion, scaption, and abduction towel slides on tabletop seated to improve strength, endurance, and fx mobility for increase indep while completing ADL/IADL tasks.     - Pendulums in shoulder flexion and abduction x 1 min each way to increase blood flow to hand to decrease numbness.     -Shoulder ABCs 1 trial 2lb DB    -Patient completed 2 sets x 15 reps performing finger ladder in shoulder flexion and abduction on incline slope to improve strength, range of motion, endurance, and functional mobility for increase indep while completing ADL/IADL tasks.      Home Exercises and Education Provided     Education provided:   - Role of OT and OT POC  - Progress towards goals     Written Home Exercises Provided: Patient instructed to cont prior HEP.  Exercises were reviewed and Moreno was able to demonstrate them prior to the end of the session.  Moreno demonstrated good  understanding of the HEP provided.   .   See EMR under Media for exercises provided 10/24/2023.        Assessment     Patient would continue to benefit from skilled occupational therapy to increase pain free range of motion and decrease numbness and tingling. Patient reported less pain this day and decreased numbness and tingling. Patient able to complete all ax this day with min rest breaks. Patient with min complaints of increased pain during ax.  Occupational therapist /[facility] collaboration to discuss POC, improvements, and d/c planning on todays date.      Moreno is progressing well towards his goals and there are no updates to goals at this time. Pt prognosis is Good.     Pt will continue to benefit from skilled OT intervention.  Patient continues to demonstrate limitation  with  ROM, Joint mobility, Stiffness,  Decreased functional use, Impaired sensation, Decreased strength, and Continued pain     Goals:  Goals to be met in 6 weeks:  1) Pt will be independent and report performing HEP to maximize (R) shoulder functional capacity.   2) Pt will increase shoulder strength in all measured planes of motion to 4/5 with daily task.  3) Pt will report use of home modalities for pain management.  4) Pt will report one degree less of pain with nonuse.  5) Pt will increase ability to move/handle objects with an increased score on FOTO >=67 (currently 55)  6) Pt will increase shoulder AROM in all measured planes of motion by 5-10 degrees with daily task.   7) Pt will report increased independence in ADLs.       Plan     Moreno to be treated by Occupational Therapy 2 times per week for 6 weeks to achieve the established goals.   Treatment to include Ultrasoud @ 3mHz, AAROM Mobilization of GH joint, PROM Home program, Ice, Moist heat, Strengthening Theraband Ex, UBE , and E- stim as well as any other treatments deemed necessary based on the patient's needs or progress.      Certification Dates: 10/24/2023 - 12/8/2023    Updates/Grading for next session: N/A      Curtis Bravo OT

## 2023-11-28 ENCOUNTER — OFFICE VISIT (OUTPATIENT)
Dept: ORTHOPEDICS | Facility: CLINIC | Age: 43
End: 2023-11-28
Payer: MEDICAID

## 2023-11-28 DIAGNOSIS — G56.91 MONONEURITIS ARM, RIGHT: ICD-10-CM

## 2023-11-28 DIAGNOSIS — R29.898 WEAKNESS OF SHOULDER: ICD-10-CM

## 2023-11-28 DIAGNOSIS — M25.511 ACUTE PAIN OF RIGHT SHOULDER: Primary | ICD-10-CM

## 2023-11-28 DIAGNOSIS — M67.911 DISORDER OF RIGHT ROTATOR CUFF: ICD-10-CM

## 2023-11-28 PROCEDURE — 99999 PR PBB SHADOW E&M-EST. PATIENT-LVL III: ICD-10-PCS | Mod: PBBFAC,,, | Performed by: NURSE PRACTITIONER

## 2023-11-28 PROCEDURE — 99213 OFFICE O/P EST LOW 20 MIN: CPT | Mod: S$PBB,,, | Performed by: NURSE PRACTITIONER

## 2023-11-28 PROCEDURE — 1159F PR MEDICATION LIST DOCUMENTED IN MEDICAL RECORD: ICD-10-PCS | Mod: CPTII,,, | Performed by: NURSE PRACTITIONER

## 2023-11-28 PROCEDURE — 1160F PR REVIEW ALL MEDS BY PRESCRIBER/CLIN PHARMACIST DOCUMENTED: ICD-10-PCS | Mod: CPTII,,, | Performed by: NURSE PRACTITIONER

## 2023-11-28 PROCEDURE — 99999 PR PBB SHADOW E&M-EST. PATIENT-LVL III: CPT | Mod: PBBFAC,,, | Performed by: NURSE PRACTITIONER

## 2023-11-28 PROCEDURE — 99213 OFFICE O/P EST LOW 20 MIN: CPT | Mod: PBBFAC | Performed by: NURSE PRACTITIONER

## 2023-11-28 PROCEDURE — 99213 PR OFFICE/OUTPT VISIT, EST, LEVL III, 20-29 MIN: ICD-10-PCS | Mod: S$PBB,,, | Performed by: NURSE PRACTITIONER

## 2023-11-28 PROCEDURE — 1160F RVW MEDS BY RX/DR IN RCRD: CPT | Mod: CPTII,,, | Performed by: NURSE PRACTITIONER

## 2023-11-28 PROCEDURE — 1159F MED LIST DOCD IN RCRD: CPT | Mod: CPTII,,, | Performed by: NURSE PRACTITIONER

## 2023-11-28 NOTE — PROGRESS NOTES
Subjective:      Follow up: RT shoulder;     Moreno Andrew Berman is a 43 y.o. left handed male who presents for follow up for chronic right shoulder pain, weakness and difficulty to lift the right arm. Previous MRI showed a partial-thickness tear of the supraspinatus tendon at the humeral head without tendon retraction. He presents today and states that therapy has helped some with motion but he continues with difficulty lifting, chronic numbness and weakness in his right  arm. He states that he has numbness in his arm, hand and fingers. He reports right hand grasp weakness as previous. He rates his pain as 2/10. He states that he is unable to lift the arm and feels very weak as previous. Symptoms are aggravated by lifting, repetitive use and attempted over head motions.      The following portions of the patient's history were reviewed and updated as appropriate: allergies, current medications, past family history, past medical history, past social history, past surgical history, and problem list.      Review of Systems   Constitutional: Negative.  Negative for fever.   Musculoskeletal:  Positive for joint pain.   Skin: Negative.    Neurological: Negative.    Psychiatric/Behavioral: Anxiety        Objective:   NVI  General:  alert, appears stated age, and cooperative   Gait:  Normal.       Right Shoulder  Bruising:   absent   Crepitus:  absent   Joint Tenderness:  lateral acromial, rotator cuff and deltoid.    Active ROM:   Decreased in all planes.   FF < 60 AROM.   Very limited AROM with IR and ER as previous.   Neer:   positive   [place]  positive   Speeds negative   Cross arm negative   Empty can positive   Drop arm Weakness noted.    Stability:   normal   Apprehension Sign:   negative   Atrophy:   none noted   Strength:  biceps 4/5, triceps 4/5, abduction 3/5, adduction 3/5   Mild RT sided hand grasp weakness noted.   Contralateral shoulder was without deficit.   Brisk refill noted.       Imaging  X-Ray:  Reviewed of the right shoulder 09/20/23  No evidence of a fracture or dislocation     MR shoulder RT:   Impression:     Partial-thickness tear of the supraspinatus tendon at the humeral head without tendon retraction or muscle atrophy detected.     Assessment:      RT shoulder pain, partial tear supraspinatus  RT upper extremity weakness- mononeuritis     Plan:      He has made mild clinical progress with OT thus far. Will make referral to neurology to further assess the RT UE mononeuritis and weakness.   Continue Home physician directed RT shoulder HEP and OT for modalities as directed.   Mobic as previous.   Ice and over head rest as directed.  RTC 4 weeks for follow up. Will make referral to  if needed pending neurology findings.   He had no further questions.

## 2023-11-29 ENCOUNTER — CLINICAL SUPPORT (OUTPATIENT)
Dept: REHABILITATION | Facility: HOSPITAL | Age: 43
End: 2023-11-29
Payer: MEDICAID

## 2023-11-29 DIAGNOSIS — M25.511 ACUTE PAIN OF RIGHT SHOULDER: Primary | ICD-10-CM

## 2023-11-29 PROCEDURE — 97530 THERAPEUTIC ACTIVITIES: CPT

## 2023-11-29 NOTE — PROGRESS NOTES
"  Occupational Therapy Daily Treatment Note     Date: 11/29/2023  Name: Moreno Berman  MRN: [id number]    Diagnosis:   Encounter Diagnosis   Name Primary?    Acute pain of right shoulder Yes       Referring Physician: Akin Adams NP    Evaluation Date: 10/24/2023  Plan of Care Certification Period: 10/24/2023 - 12/8/2023    Visit # / Visits authorized: 9 / 12  GALAVIZ visit number: 0  Time In: 10:00 AM  Time Out: 10:54 AM   Total Billable Time: 54 min  Precautions:  Standard      Subjective     Pt reports: "I'm alright."   he was compliant with home exercise program given last session.     Pain: 3/10  Location: right shoulder      Objective     Treatment consist of the following:     Moreno received the following supervised modalities after being cleared for contradictions:   - CP to decrease pain and soreness post session.     Moreno participated in dynamic functional therapeutic activities to improve functional performance, including:  - Patient engaged in bilateral hand gripper ex with mod resistance for 2 sets x 30 reps seated to improve overall hand strength while completing ADL/IADL tasks. Patient needed rest breaks b/w sets.  Patient completed while tolerating MHP.     - Patient engaged in bilateral upper extrimity exercise completing 2 sets x 15 reps of scapular ret/protraction, wrist flex/extension, and supination/pronation using green Flexbar with seated to faciliate an increase in indep while completing activity of daily living tasks. Patient needed seated rest breaks b/w each set.  Patient completed while tolerating MHP.     -Patient engaged in OH pulley in shoulder flexion and scaption exercise x 3 min each way to increase ROM in bilateral shoulders for increase indep with dressing, grooming, and bathing ADL tasks.     -Patient performed bilateral upper body ergonomic exercise for 1 sets x 8 min with min rest breaks to facilitate an increase in strength, functional mobility, range of motion, and " endurance for increased indep, cardiopulmonary functions, and performance with activities of daily living.     -Patient completed 2 sets x 10 reps performing shoulder flexion, scaption, and abduction towel slides on tabletop seated to improve strength, endurance, and fx mobility for increase indep while completing ADL/IADL tasks.     - Pendulums in shoulder flexion and abduction x 1 min each way to increase blood flow to hand to decrease numbness.     -Shoulder ABCs 1 trial 2lb DB    -Patient completed 2 sets x 15 reps performing finger ladder in shoulder flexion and abduction on incline slope to improve strength, range of motion, endurance, and functional mobility for increase indep while completing ADL/IADL tasks.      Home Exercises and Education Provided     Education provided:   - Role of OT and OT POC  - Progress towards goals     Written Home Exercises Provided: Patient instructed to cont prior HEP.  Exercises were reviewed and Moreno was able to demonstrate them prior to the end of the session.  Moreno demonstrated good  understanding of the HEP provided.   .   See EMR under Media for exercises provided 10/24/2023.        Assessment     Patient would continue to benefit from skilled occupational therapy to increase pain free range of motion and decrease numbness and tingling. Patient reported less pain this day but increased numbness and tingling post omnicycle. Patient able to complete all ax this day with min rest breaks. Patient with min complaints of increased pain during ax. Patient reporting that he has an upcoming appointment with neuro for the numbness that he is experiencing  Occupational therapist /[facility] collaboration to discuss POC, improvements, and d/c planning on todays date.      Moreno is progressing well towards his goals and there are no updates to goals at this time. Pt prognosis is Good.     Pt will continue to benefit from skilled OT intervention.  Patient continues to demonstrate limitation   with  ROM, Joint mobility, Stiffness, Decreased functional use, Impaired sensation, Decreased strength, and Continued pain     Goals:  Goals to be met in 6 weeks:  1) Pt will be independent and report performing HEP to maximize (R) shoulder functional capacity.   2) Pt will increase shoulder strength in all measured planes of motion to 4/5 with daily task.  3) Pt will report use of home modalities for pain management.  4) Pt will report one degree less of pain with nonuse.  5) Pt will increase ability to move/handle objects with an increased score on FOTO >=67 (currently 55)  6) Pt will increase shoulder AROM in all measured planes of motion by 5-10 degrees with daily task.   7) Pt will report increased independence in ADLs.       Plan     Moreon to be treated by Occupational Therapy 2 times per week for 6 weeks to achieve the established goals.   Treatment to include Ultrasoud @ 3mHz, AAROM Mobilization of GH joint, PROM Home program, Ice, Moist heat, Strengthening Theraband Ex, UBE , and E- stim as well as any other treatments deemed necessary based on the patient's needs or progress.      Certification Dates: 10/24/2023 - 12/8/2023    Updates/Grading for next session: N/A      Curtis Bravo OT

## 2023-12-01 ENCOUNTER — CLINICAL SUPPORT (OUTPATIENT)
Dept: REHABILITATION | Facility: HOSPITAL | Age: 43
End: 2023-12-01
Payer: MEDICAID

## 2023-12-01 DIAGNOSIS — M25.511 ACUTE PAIN OF RIGHT SHOULDER: Primary | ICD-10-CM

## 2023-12-01 PROCEDURE — 97530 THERAPEUTIC ACTIVITIES: CPT

## 2023-12-01 NOTE — PROGRESS NOTES
"  Occupational Therapy Daily Treatment Note     Date: 12/1/2023  Name: Moreno Berman  MRN: [id number]    Diagnosis:   Encounter Diagnosis   Name Primary?    Acute pain of right shoulder Yes       Referring Physician: Akin Adams NP    Evaluation Date: 10/24/2023  Plan of Care Certification Period: 10/24/2023 - 12/8/2023    Visit # / Visits authorized: 10 / 12  GALAVIZ visit number: 0  Time In: 10:00 AM  Time Out: 10:55 AM   Total Billable Time: 55 min  Precautions:  Standard      Subjective     Pt reports: "I'm alright."   he was compliant with home exercise program given last session.     Pain: 2/10  Location: right shoulder      Objective     Treatment consist of the following:     Moreno received the following supervised modalities after being cleared for contradictions:   - CP to decrease pain and soreness post session.     Moreno participated in dynamic functional therapeutic activities to improve functional performance, including:  - Patient engaged in bilateral hand gripper ex with mod resistance for 2 sets x 30 reps seated to improve overall hand strength while completing ADL/IADL tasks. Patient needed rest breaks b/w sets.  Patient completed while tolerating MHP.     - Patient engaged in bilateral upper extrimity exercise completing 2 sets x 15 reps of scapular ret/protraction, wrist flex/extension, and supination/pronation using green Flexbar with seated to faciliate an increase in indep while completing activity of daily living tasks. Patient needed seated rest breaks b/w each set.  Patient completed while tolerating MHP.     -Patient engaged in OH pulley in shoulder flexion and scaption exercise x 3 min each way to increase ROM in bilateral shoulders for increase indep with dressing, grooming, and bathing ADL tasks.     -Patient performed bilateral upper body ergonomic exercise for 1 sets x 8 min with min rest breaks to facilitate an increase in strength, functional mobility, range of motion, and " endurance for increased indep, cardiopulmonary functions, and performance with activities of daily living.     -Patient completed 2 sets x 10 reps performing shoulder flexion, scaption, and abduction towel slides on tabletop seated to improve strength, endurance, and fx mobility for increase indep while completing ADL/IADL tasks.     - Pendulums in shoulder flexion and abduction x 1 min each way to increase blood flow to hand to decrease numbness.     -Shoulder ABCs 1 trial 2lb DB    -Patient completed 2 sets x 15 reps performing finger ladder in shoulder flexion and abduction on incline slope to improve strength, range of motion, endurance, and functional mobility for increase indep while completing ADL/IADL tasks.      Home Exercises and Education Provided     Education provided:   - Role of OT and OT POC  - Progress towards goals     Written Home Exercises Provided: Patient instructed to cont prior HEP.  Exercises were reviewed and Moreno was able to demonstrate them prior to the end of the session.  Moreno demonstrated good  understanding of the HEP provided.   .   See EMR under Media for exercises provided 10/24/2023.        Assessment     Patient would continue to benefit from skilled occupational therapy to increase pain free range of motion and decrease numbness and tingling. Patient reported less pain this day. Patient able to complete all ax this day with min rest breaks. Patient with min complaints of increased pain during ax. Pt without pain post treatment. Occupational therapist /GAL[facility] collaboration to discuss POC, improvements, and d/c planning on todays date.      Moreno is progressing well towards his goals and there are no updates to goals at this time. Pt prognosis is Good.     Pt will continue to benefit from skilled OT intervention.  Patient continues to demonstrate limitation  with  ROM, Joint mobility, Stiffness, Decreased functional use, Impaired sensation, Decreased strength, and Continued pain      Goals:  Goals to be met in 6 weeks:  1) Pt will be independent and report performing HEP to maximize (R) shoulder functional capacity.   2) Pt will increase shoulder strength in all measured planes of motion to 4/5 with daily task.  3) Pt will report use of home modalities for pain management.  4) Pt will report one degree less of pain with nonuse.  5) Pt will increase ability to move/handle objects with an increased score on FOTO >=67 (currently 55)  6) Pt will increase shoulder AROM in all measured planes of motion by 5-10 degrees with daily task.   7) Pt will report increased independence in ADLs.       Plan     Moreno to be treated by Occupational Therapy 2 times per week for 6 weeks to achieve the established goals.   Treatment to include Ultrasoud @ 3mHz, AAROM Mobilization of GH joint, PROM Home program, Ice, Moist heat, Strengthening Theraband Ex, UBE , and E- stim as well as any other treatments deemed necessary based on the patient's needs or progress.      Certification Dates: 10/24/2023 - 12/8/2023    Updates/Grading for next session: N/A      Curtis Bravo OT

## 2023-12-06 ENCOUNTER — CLINICAL SUPPORT (OUTPATIENT)
Dept: REHABILITATION | Facility: HOSPITAL | Age: 43
End: 2023-12-06
Payer: MEDICAID

## 2023-12-06 ENCOUNTER — TELEPHONE (OUTPATIENT)
Dept: ORTHOPEDICS | Facility: CLINIC | Age: 43
End: 2023-12-06
Payer: MEDICAID

## 2023-12-06 DIAGNOSIS — M25.511 ACUTE PAIN OF RIGHT SHOULDER: Primary | ICD-10-CM

## 2023-12-06 PROCEDURE — 97530 THERAPEUTIC ACTIVITIES: CPT

## 2023-12-06 NOTE — PROGRESS NOTES
"  Occupational Therapy Daily Treatment/ progress Note     Date: 12/6/2023  Name: Moreno Berman  MRN: [id number]    Diagnosis:   Encounter Diagnosis   Name Primary?    Acute pain of right shoulder Yes         Referring Physician: Akin Adams NP    Evaluation Date: 10/24/2023  Plan of Care Certification Period: 10/24/2023 - 12/8/2023  New POC Cert dates: 12/6/2023 - 1/19/2024    Visit # / Visits authorized: 11 / 12  GALAVIZ visit number: 0  Time In: 10:00 AM  Time Out: 10:55 AM   Total Billable Time: 55 min  Precautions:  Standard      Subjective     Pt reports: "I'm ok."   he was compliant with home exercise program given last session.     Pain: 2/10  Location: right shoulder      Objective     Range of Motion:   Right: limited as follows: (see measurements table below)  Left: WNL       (R) UE       AROM Norm       0-180   Shoulder Flexion 80 120 180   Shoulder Abduction 85 110 0-180   Shoulder Extension 45  0-50   Shoulder Internal Rotation 75 85 0-90   Shoulder External Rotation 75  85 0-90      ROM Comments:   Pain at end range        Strength  Shoulder Flexion RUE: 3-/5   Shoulder Extension RUE: 3-/5   Shoulder Abduction RUE:  3-/5   Shoulder Adduction RUE:  3-/5   Internal Rotation RUE: 3-/5   External Rotation RUE: 3-/5   Horizontal Adduction RUE: 3-/5   Shoulder Flexion LUE: 5/5   Shoulder Extension LUE: 5/5   Shoulder Abduction LUE: 5/5   Shoulder Abbduction LUE: 5/5   Internal Rotation LUE:  5/5   External Rotation LUE:  5/5   Horizontal Adduction LUE:  5/5         Special Tests:  Positive: Neer Impingement, Caleb Test, and Empty can test  Negative: Drop Arm Test, Cross Arm, and Speed's Test        Palpation: (for pain)     Positive: Supraspinatus Region     Limitations of Functional Status:   Self Care: requires increase time to don clothes, mopping, sweeping, reaching behind and reaching overhead  Work: lifting, pushing  Leisure: driving    FOTO: 65    Treatment consist of the following:     Moreno " received the following supervised modalities after being cleared for contradictions:   - CP to decrease pain and soreness post session. (Not today)    Moreno participated in dynamic functional therapeutic activities to improve functional performance, including:  - Patient engaged in bilateral hand gripper ex with mod resistance for 2 sets x 30 reps seated to improve overall hand strength while completing ADL/IADL tasks. Patient needed rest breaks b/w sets.  Patient completed while tolerating MHP.     - Patient engaged in bilateral upper extrimity exercise completing 2 sets x 15 reps of scapular ret/protraction, wrist flex/extension, and supination/pronation using green Flexbar with seated to faciliate an increase in indep while completing activity of daily living tasks. Patient needed seated rest breaks b/w each set.  Patient completed while tolerating MHP.     -Patient engaged in OH pulley in shoulder flexion and scaption exercise x 3 min each way to increase ROM in bilateral shoulders for increase indep with dressing, grooming, and bathing ADL tasks.     -Patient performed bilateral upper body ergonomic exercise for 1 sets x 8 min with min rest breaks to facilitate an increase in strength, functional mobility, range of motion, and endurance for increased indep, cardiopulmonary functions, and performance with activities of daily living.     -Patient completed 2 sets x 10 reps performing shoulder flexion, scaption, and abduction towel slides on tabletop seated to improve strength, endurance, and fx mobility for increase indep while completing ADL/IADL tasks.     - Pendulums in shoulder flexion and abduction x 1 min each way to increase blood flow to hand to decrease numbness.     -Shoulder ABCs 1 trial 2lb DB    -Patient completed 2 sets x 15 reps performing finger ladder in shoulder flexion and abduction on incline slope to improve strength, range of motion, endurance, and functional mobility for increase indep while  completing ADL/IADL tasks.      Home Exercises and Education Provided     Education provided:   - Role of OT and OT POC  - Progress towards goals     Written Home Exercises Provided: Patient instructed to cont prior HEP.  Exercises were reviewed and Moreno was able to demonstrate them prior to the end of the session.  Moreno demonstrated good  understanding of the HEP provided.   .   See EMR under Media for exercises provided 10/24/2023.        Assessment     Patient would continue to benefit from skilled occupational therapy to increase pain free range of motion and decrease numbness and tingling. Patient reported less pain this day. Patient able to complete all ax this day with min rest breaks. Patient with min complaints of increased pain during ax. Patient objective measurements taken and goals updated below. Patient has progressed in all goals and is on track to return to PLOF. Occupational therapist /GAL[facility] collaboration to discuss POC, improvements, and d/c planning on todays date.      Moreno is progressing well towards his goals and there are no updates to goals at this time. Pt prognosis is Good.     Pt will continue to benefit from skilled OT intervention.  Patient continues to demonstrate limitation  with  ROM, Joint mobility, Stiffness, Decreased functional use, Impaired sensation, Decreased strength, and Continued pain     Goals:  Goals to be met in 6 weeks:  1) Pt will be independent and report performing HEP to maximize (R) shoulder functional capacity. Progressing/ongoing  2) Pt will increase shoulder strength in all measured planes of motion to 4/5 with daily task.Progressing/ongoing  3) Pt will report use of home modalities for pain management. MET  4) Pt will report one degree less of pain with nonuse.Progressing/ongoing  5) Pt will increase ability to move/handle objects with an increased score on FOTO >=67 (currently 65) Progressing/ongoing  6) Pt will increase shoulder AROM in all measured planes of  motion by 5-10 degrees with daily task. MET  7) Pt will report increased independence in ADLs. Progressing    New goals to be met in 6 weeks:  1) Pt will be independent and report performing HEP to maximize (R) shoulder functional capacity.   2) Pt will increase shoulder strength in all measured planes of motion to 4/5 with daily task.  3) Pt will report increased independence in ADLs.  4) Pt will report one degree less of pain with nonuse.  5) Pt will increase ability to move/handle objects with an increased score on FOTO >=67 (currently 65)   6) Pt will increase shoulder AROM in all measured planes of motion by 5-10 degrees with daily task.          Plan     Moreno to be treated by Occupational Therapy 2 times per week for 6 weeks to achieve the established goals.   Treatment to include Ultrasoud @ 3mHz, AAROM Mobilization of GH joint, PROM Home program, Ice, Moist heat, Strengthening Theraband Ex, UBE , and E- stim as well as any other treatments deemed necessary based on the patient's needs or progress.      Certification Dates: 12/6/2023 - 1/19/2024    Updates/Grading for next session: N/A      Curtis Bravo OT        I certify the need for these services furnished under this plan of treatment and while under my care     ____________________________________  Physician/Referring Practitioner     _______________  Date of Signature

## 2023-12-08 ENCOUNTER — CLINICAL SUPPORT (OUTPATIENT)
Dept: REHABILITATION | Facility: HOSPITAL | Age: 43
End: 2023-12-08
Payer: MEDICAID

## 2023-12-08 DIAGNOSIS — M25.511 ACUTE PAIN OF RIGHT SHOULDER: Primary | ICD-10-CM

## 2023-12-08 PROCEDURE — 97530 THERAPEUTIC ACTIVITIES: CPT

## 2023-12-08 NOTE — PROGRESS NOTES
"  Occupational Therapy Daily Treatment Note     Date: 12/8/2023  Name: Moreno Berman  MRN: [id number]    Diagnosis:   Encounter Diagnosis   Name Primary?    Acute pain of right shoulder Yes       Referring Physician: Akin Adams NP    Evaluation Date: 10/24/2023  POC Cert dates: 12/6/2023 - 1/19/2024    Visit # / Visits authorized: 12 / 12  GALAVIZ visit number: 0  Time In: 10:00 AM  Time Out: 10:53 AM   Total Billable Time: 53 min  Precautions:  Standard      Subjective     Pt reports: "I'm ok."   he was compliant with home exercise program given last session.     Pain: 2/10  Location: right shoulder      Objective     Treatment consist of the following:     Moreno received the following supervised modalities after being cleared for contradictions:   - CP to decrease pain and soreness post session. (Not today)    Moreno participated in dynamic functional therapeutic activities to improve functional performance, including:  - Patient engaged in bilateral hand gripper ex with mod resistance for 2 sets x 30 reps seated to improve overall hand strength while completing ADL/IADL tasks. Patient needed rest breaks b/w sets.  Patient completed while tolerating MHP.     - Patient engaged in bilateral upper extrimity exercise completing 2 sets x 15 reps of scapular ret/protraction, wrist flex/extension, and supination/pronation using green Flexbar with seated to faciliate an increase in indep while completing activity of daily living tasks. Patient needed seated rest breaks b/w each set.  Patient completed while tolerating MHP.     -Patient engaged in OH pulley in shoulder flexion and scaption exercise x 3 min each way to increase ROM in bilateral shoulders for increase indep with dressing, grooming, and bathing ADL tasks.     -Patient performed bilateral omnicycle with 6 res exercise for 2 sets x 5 min with min rest breaks to facilitate an increase in strength, functional mobility, range of motion, and endurance for " increased indep, cardiopulmonary functions, and performance with activities of daily living.     -Patient completed 2 sets x 10 reps performing shoulder flexion, scaption, and abduction towel slides on tabletop seated to improve strength, endurance, and fx mobility for increase indep while completing ADL/IADL tasks.     -Shoulder ABCs 1 trial 2lb DB    -Bar stretch 3x10    -Patient completed 2 sets x 15 reps performing finger ladder in shoulder flexion and abduction on incline slope to improve strength, range of motion, endurance, and functional mobility for increase indep while completing ADL/IADL tasks.      Home Exercises and Education Provided     Education provided:   - Role of OT and OT POC  - Progress towards goals     Written Home Exercises Provided: Patient instructed to cont prior HEP.  Exercises were reviewed and Moreno was able to demonstrate them prior to the end of the session.  Moreno demonstrated good  understanding of the HEP provided.   .   See EMR under Media for exercises provided 10/24/2023.        Assessment     Patient would continue to benefit from skilled occupational therapy to increase pain free range of motion and decrease numbness and tingling. Patient reported less pain this day. Patient able to complete all ax this day with min rest breaks. Patient with min complaints of increased pain during ax. Patient reports that bar stretch worked better than finger ladder today. Occupational therapist /[facility] collaboration to discuss POC, improvements, and d/c planning on todays date.      Moreno is progressing well towards his goals and there are no updates to goals at this time. Pt prognosis is Good.     Pt will continue to benefit from skilled OT intervention.  Patient continues to demonstrate limitation  with  ROM, Joint mobility, Stiffness, Decreased functional use, Impaired sensation, Decreased strength, and Continued pain     Goals:    New goals to be met in 6 weeks:  1) Pt will be independent and  report performing HEP to maximize (R) shoulder functional capacity.   2) Pt will increase shoulder strength in all measured planes of motion to 4/5 with daily task.  3) Pt will report increased independence in ADLs.  4) Pt will report one degree less of pain with nonuse.  5) Pt will increase ability to move/handle objects with an increased score on FOTO >=67 (currently 65)   6) Pt will increase shoulder AROM in all measured planes of motion by 5-10 degrees with daily task.          Plan     Moreno to be treated by Occupational Therapy 2 times per week for 6 weeks to achieve the established goals.   Treatment to include Ultrasoud @ 3mHz, AAROM Mobilization of GH joint, PROM Home program, Ice, Moist heat, Strengthening Theraband Ex, UBE , and E- stim as well as any other treatments deemed necessary based on the patient's needs or progress.      Certification Dates: 12/6/2023 - 1/19/2024    Updates/Grading for next session: N/A      Curtis Bravo OT

## 2023-12-13 ENCOUNTER — CLINICAL SUPPORT (OUTPATIENT)
Dept: REHABILITATION | Facility: HOSPITAL | Age: 43
End: 2023-12-13
Payer: MEDICAID

## 2023-12-13 DIAGNOSIS — M25.511 ACUTE PAIN OF RIGHT SHOULDER: Primary | ICD-10-CM

## 2023-12-13 PROCEDURE — 97530 THERAPEUTIC ACTIVITIES: CPT | Mod: CO

## 2023-12-13 NOTE — PROGRESS NOTES
"  Occupational Therapy Daily Treatment Note     Date: 12/13/2023  Name: Moreno Berman  MRN: [id number]    Diagnosis:   Encounter Diagnosis   Name Primary?    Acute pain of right shoulder Yes       Referring Physician: Akin Adams NP    Evaluation Date: 10/24/2023  POC Cert dates: 12/6/2023 - 1/19/2024    Visit # / Visits authorized: 13 / 24  GALAVIZ visit number: 1  Time In: 9:45 AM  Time Out: 10:57 AM   Total Billable Time: 72 min  Precautions:  Standard      Subjective     Pt reports: "I woke up this morning."   he was compliant with home exercise program given last session.     Pain: 5/10  Location: right shoulder      Objective     Treatment consist of the following:     Moreno received the following supervised modalities after being cleared for contradictions:   - CP to decrease pain and soreness post session. (Not today)    Moreno participated in dynamic functional therapeutic activities to improve functional performance, including:    - Patient engaged in bilateral hand gripper ex with mod resistance for 2 sets x 30 reps seated to improve overall hand strength while completing ADL/IADL tasks. Patient needed rest breaks b/w sets.  Patient completed while tolerating MHP.     - Patient engaged in bilateral upper extrimity exercise completing 2 sets x 15 reps of scapular ret/protraction, wrist flex/extension, and supination/pronation using green Flexbar with seated to faciliate an increase in indep while completing activity of daily living tasks. Patient needed seated rest breaks b/w each set.  Patient completed while tolerating MHP.     -Patient engaged in OH pulley in shoulder flexion and scaption exercise x 3 min each way to increase ROM in bilateral shoulders for increase indep with dressing, grooming, and bathing ADL tasks.     -Patient performed bilateral omnicycle with 6 res exercise for 2 sets x 5 min with min rest breaks to facilitate an increase in strength, functional mobility, range of motion, and " endurance for increased indep, cardiopulmonary functions, and performance with activities of daily living.     -Patient completed 2 sets x 10 reps performing shoulder flexion, scaption, and abduction towel slides on tabletop seated to improve strength, endurance, and fx mobility for increase indep while completing ADL/IADL tasks.     -Shoulder ABCs 1 trial 2lb DB (NOT TODAY)    -Patient completed 2 sets x 15 reps performing finger ladder in shoulder flexion and abduction on incline slope to improve strength, range of motion, endurance, and functional mobility for increase indep while completing ADL/IADL tasks.      Home Exercises and Education Provided     Education provided:   - Role of OT and OT POC  - Progress towards goals     Written Home Exercises Provided: Patient instructed to cont prior HEP.  Exercises were reviewed and Moreno was able to demonstrate them prior to the end of the session.  Moreno demonstrated good  understanding of the HEP provided.   .   See EMR under Media for exercises provided 10/24/2023.        Assessment     Patient would continue to benefit from skilled occupational therapy to increase pain free range of motion and decrease numbness and tingling. Patient able to complete all ax this day with min rest breaks due to pain and numbness. Pt with good form while completing exercise/activity this day. Occupational therapist /[facility] collaboration to discuss POC, improvements, and d/c planning on todays date.      Moreno is progressing well towards his goals and there are no updates to goals at this time. Pt prognosis is Good.     Pt will continue to benefit from skilled OT intervention.  Patient continues to demonstrate limitation  with  ROM, Joint mobility, Stiffness, Decreased functional use, Impaired sensation, Decreased strength, and Continued pain     Goals:    New goals to be met in 6 weeks:  1) Pt will be independent and report performing HEP to maximize (R) shoulder functional capacity.   2)  Pt will increase shoulder strength in all measured planes of motion to 4/5 with daily task.  3) Pt will report increased independence in ADLs.  4) Pt will report one degree less of pain with nonuse.  5) Pt will increase ability to move/handle objects with an increased score on FOTO >=67 (currently 65)   6) Pt will increase shoulder AROM in all measured planes of motion by 5-10 degrees with daily task.          Plan     Moreno to be treated by Occupational Therapy 2 times per week for 6 weeks to achieve the established goals.   Treatment to include Ultrasoud @ 3mHz, AAROM Mobilization of GH joint, PROM Home program, Ice, Moist heat, Strengthening Theraband Ex, UBE , and E- stim as well as any other treatments deemed necessary based on the patient's needs or progress.      Certification Dates: 12/6/2023 - 1/19/2024    Updates/Grading for next session: N/A      BETH Wheat

## 2023-12-15 ENCOUNTER — CLINICAL SUPPORT (OUTPATIENT)
Dept: REHABILITATION | Facility: HOSPITAL | Age: 43
End: 2023-12-15
Payer: MEDICAID

## 2023-12-15 DIAGNOSIS — M67.911 DISORDER OF RIGHT ROTATOR CUFF: ICD-10-CM

## 2023-12-15 DIAGNOSIS — M25.511 ACUTE PAIN OF RIGHT SHOULDER: Primary | ICD-10-CM

## 2023-12-15 PROCEDURE — 97530 THERAPEUTIC ACTIVITIES: CPT | Mod: CO

## 2023-12-15 NOTE — PROGRESS NOTES
"  Occupational Therapy Daily Treatment Note     Date: 12/15/2023  Name: Moreno Berman  MRN: [id number]    Diagnosis:   Encounter Diagnoses   Name Primary?    Acute pain of right shoulder Yes    Disorder of right rotator cuff        Referring Physician: Akin Adams NP    Evaluation Date: 10/24/2023  POC Cert dates: 12/6/2023 - 1/19/2024    Visit # / Visits authorized: 14 / 24  GALAVIZ visit number: 2  Time In: 9:45 AM  Time Out: 10:45 AM   Total Billable Time:60 min  Precautions:  Standard      Subjective     Pt reports: "Im good today just that numbness and tingling."   he was compliant with home exercise program given last session.     Pain: 0/10  Location: right shoulder      Objective     Treatment consist of the following:     Moreno received the following supervised modalities after being cleared for contradictions:   - CP to decrease pain and soreness post session. (Not today)    Moreno participated in dynamic functional therapeutic activities to improve functional performance, including:    - Patient engaged in bilateral hand gripper ex with mod resistance for 2 sets x 30 reps seated to improve overall hand strength while completing ADL/IADL tasks. Patient needed rest breaks b/w sets.  Patient completed while tolerating MHP.     - Patient engaged in bilateral upper extrimity exercise completing 2 sets x 15 reps of scapular ret/protraction, wrist flex/extension, and supination/pronation using green Flexbar with seated to faciliate an increase in indep while completing activity of daily living tasks. Patient needed seated rest breaks b/w each set.  Patient completed while tolerating MHP.     -Patient engaged in OH pulley in shoulder flexion and scaption exercise x 3 min each way to increase ROM in bilateral shoulders for increase indep with dressing, grooming, and bathing ADL tasks.     -Patient performed bilateral omnicycle with 7 res exercise for 2 sets x 5 min with min rest breaks to facilitate an " increase in strength, functional mobility, range of motion, and endurance for increased indep, cardiopulmonary functions, and performance with activities of daily living.     -Patient completed 2 sets x 10 reps performing shoulder flexion, scaption, and abduction towel slides on tabletop seated to improve strength, endurance, and fx mobility for increase indep while completing ADL/IADL tasks.     -Shoulder ABCs 1 trial 2lb DB (NOT TODAY)    -Patient completed 2 sets x 15 reps performing finger ladder in shoulder flexion and abduction on incline slope to improve strength, range of motion, endurance, and functional mobility for increase indep while completing ADL/IADL tasks.      Home Exercises and Education Provided     Education provided:   - Role of OT and OT POC  - Progress towards goals     Written Home Exercises Provided: Patient instructed to cont prior HEP.  Exercises were reviewed and Moreno was able to demonstrate them prior to the end of the session.  Moreno demonstrated good  understanding of the HEP provided.   .   See EMR under Media for exercises provided 10/24/2023.        Assessment     Patient would continue to benefit from skilled occupational therapy to increase pain free range of motion and decrease numbness and tingling. Patient able to complete all ax this day with min rest breaks due to pain and numbness. Pt with good form while completing exercise/activity this day. Pt reports he had MD correa coming up for shoulder. Occupational therapist /[facility] collaboration to discuss POC, improvements, and d/c planning on todays date.      Moreno is progressing well towards his goals and there are no updates to goals at this time. Pt prognosis is Good.     Pt will continue to benefit from skilled OT intervention.  Patient continues to demonstrate limitation  with  ROM, Joint mobility, Stiffness, Decreased functional use, Impaired sensation, Decreased strength, and Continued pain     Goals:    New goals to be met in  6 weeks:  1) Pt will be independent and report performing HEP to maximize (R) shoulder functional capacity.   2) Pt will increase shoulder strength in all measured planes of motion to 4/5 with daily task.  3) Pt will report increased independence in ADLs.  4) Pt will report one degree less of pain with nonuse.  5) Pt will increase ability to move/handle objects with an increased score on FOTO >=67 (currently 65)   6) Pt will increase shoulder AROM in all measured planes of motion by 5-10 degrees with daily task.          Plan     Moreno to be treated by Occupational Therapy 2 times per week for 6 weeks to achieve the established goals.   Treatment to include Ultrasoud @ 3mHz, AAROM Mobilization of GH joint, PROM Home program, Ice, Moist heat, Strengthening Theraband Ex, UBE , and E- stim as well as any other treatments deemed necessary based on the patient's needs or progress.      Certification Dates: 12/6/2023 - 1/19/2024    Updates/Grading for next session: N/A      BETH Wheat

## 2023-12-18 ENCOUNTER — CLINICAL SUPPORT (OUTPATIENT)
Dept: REHABILITATION | Facility: HOSPITAL | Age: 43
End: 2023-12-18
Payer: MEDICAID

## 2023-12-18 DIAGNOSIS — M25.511 ACUTE PAIN OF RIGHT SHOULDER: Primary | ICD-10-CM

## 2023-12-18 PROCEDURE — 97530 THERAPEUTIC ACTIVITIES: CPT | Mod: CO

## 2023-12-18 NOTE — PROGRESS NOTES
"  Occupational Therapy Daily Treatment Note     Date: 12/18/2023  Name: Moreno Berman  MRN: [id number]    Diagnosis:   Encounter Diagnosis   Name Primary?    Acute pain of right shoulder Yes       Referring Physician: Akin Adams NP    Evaluation Date: 10/24/2023  POC Cert dates: 12/6/2023 - 1/19/2024    Visit # / Visits authorized: 15 / 24  GALAVIZ visit number: 3  Time In: 9:51 AM  Time Out: 10:50 AM   Total Billable Time:59 min  Precautions:  Standard      Subjective     Pt reports: "Im good today just that numbness and tingling."   he was compliant with home exercise program given last session.     Pain: 0/10  Location: right shoulder      Objective     Treatment consist of the following:     Moreno received the following supervised modalities after being cleared for contradictions:   - CP to decrease pain and soreness post session. (Not today)    Moreno participated in dynamic functional therapeutic activities to improve functional performance, including:    - Patient engaged in bilateral hand gripper ex with mod resistance for 2 sets x 30 reps seated to improve overall hand strength while completing ADL/IADL tasks. Patient needed rest breaks b/w sets.  Patient completed while tolerating MHP.     - Patient engaged in bilateral upper extrimity exercise completing 2 sets x 15 reps of scapular ret/protraction, wrist flex/extension, and supination/pronation using green Flexbar with seated to faciliate an increase in indep while completing activity of daily living tasks. Patient needed seated rest breaks b/w each set.  Patient completed while tolerating MHP.     -Patient engaged in OH pulley in shoulder flexion and scaption exercise x 3 min each way to increase ROM in bilateral shoulders for increase indep with dressing, grooming, and bathing ADL tasks.     -Patient performed bilateral omnicycle with 7 res exercise for 2 sets x 5 min with min rest breaks to facilitate an increase in strength, functional mobility, " range of motion, and endurance for increased indep, cardiopulmonary functions, and performance with activities of daily living.     -Patient completed 2 sets x 10 reps performing shoulder flexion, scaption, and abduction towel slides on tabletop seated to improve strength, endurance, and fx mobility for increase indep while completing ADL/IADL tasks.     -Shoulder ABCs 1 trial 2lb DB     -Patient completed 2 sets x 15 reps performing finger ladder in shoulder flexion and abduction on incline slope to improve strength, range of motion, endurance, and functional mobility for increase indep while completing ADL/IADL tasks.      Home Exercises and Education Provided     Education provided:   - Role of OT and OT POC  - Progress towards goals     Written Home Exercises Provided: Patient instructed to cont prior HEP.  Exercises were reviewed and Moreno was able to demonstrate them prior to the end of the session.  Moreno demonstrated good  understanding of the HEP provided.   .   See EMR under Media for exercises provided 10/24/2023.        Assessment     Patient would continue to benefit from skilled occupational therapy to increase pain free range of motion and decrease numbness and tingling. Patient able to complete all ax this day with min rest breaks due to pain and numbness. Pt with good form while completing exercise/activity this day. Pt reports he had MD sunny irizarry for shoulder. Pt to progress to light strengthening exercise next visit if continues with no pain. Occupational therapist /GALAVIZ collaboration to discuss POC, improvements, and d/c planning on todays date.      Moreno is progressing well towards his goals and there are no updates to goals at this time. Pt prognosis is Good.     Pt will continue to benefit from skilled OT intervention.  Patient continues to demonstrate limitation  with  ROM, Joint mobility, Stiffness, Decreased functional use, Impaired sensation, Decreased strength, and Continued pain      Goals:    New goals to be met in 6 weeks:  1) Pt will be independent and report performing HEP to maximize (R) shoulder functional capacity.   2) Pt will increase shoulder strength in all measured planes of motion to 4/5 with daily task.  3) Pt will report increased independence in ADLs.  4) Pt will report one degree less of pain with nonuse.  5) Pt will increase ability to move/handle objects with an increased score on FOTO >=67 (currently 65)   6) Pt will increase shoulder AROM in all measured planes of motion by 5-10 degrees with daily task.          Plan     Moreno to be treated by Occupational Therapy 2 times per week for 6 weeks to achieve the established goals.   Treatment to include Ultrasoud @ 3mHz, AAROM Mobilization of GH joint, PROM Home program, Ice, Moist heat, Strengthening Theraband Ex, UBE , and E- stim as well as any other treatments deemed necessary based on the patient's needs or progress.      Certification Dates: 12/6/2023 - 1/19/2024    Updates/Grading for next session: N/A      BETH Wheat

## 2023-12-27 ENCOUNTER — CLINICAL SUPPORT (OUTPATIENT)
Dept: REHABILITATION | Facility: HOSPITAL | Age: 43
End: 2023-12-27
Payer: MEDICAID

## 2023-12-27 ENCOUNTER — OFFICE VISIT (OUTPATIENT)
Dept: ORTHOPEDICS | Facility: CLINIC | Age: 43
End: 2023-12-27
Payer: MEDICAID

## 2023-12-27 DIAGNOSIS — M25.511 ACUTE PAIN OF RIGHT SHOULDER: Primary | ICD-10-CM

## 2023-12-27 DIAGNOSIS — M67.911 DISORDER OF RIGHT ROTATOR CUFF: ICD-10-CM

## 2023-12-27 DIAGNOSIS — G56.91 MONONEURITIS ARM, RIGHT: ICD-10-CM

## 2023-12-27 PROCEDURE — 1159F PR MEDICATION LIST DOCUMENTED IN MEDICAL RECORD: ICD-10-PCS | Mod: CPTII,,, | Performed by: NURSE PRACTITIONER

## 2023-12-27 PROCEDURE — 99999 PR PBB SHADOW E&M-EST. PATIENT-LVL II: ICD-10-PCS | Mod: PBBFAC,,, | Performed by: NURSE PRACTITIONER

## 2023-12-27 PROCEDURE — 99212 OFFICE O/P EST SF 10 MIN: CPT | Mod: PBBFAC | Performed by: NURSE PRACTITIONER

## 2023-12-27 PROCEDURE — 99999 PR PBB SHADOW E&M-EST. PATIENT-LVL II: CPT | Mod: PBBFAC,,, | Performed by: NURSE PRACTITIONER

## 2023-12-27 PROCEDURE — 97530 THERAPEUTIC ACTIVITIES: CPT

## 2023-12-27 PROCEDURE — 99213 PR OFFICE/OUTPT VISIT, EST, LEVL III, 20-29 MIN: ICD-10-PCS | Mod: S$PBB,,, | Performed by: NURSE PRACTITIONER

## 2023-12-27 PROCEDURE — 1159F MED LIST DOCD IN RCRD: CPT | Mod: CPTII,,, | Performed by: NURSE PRACTITIONER

## 2023-12-27 PROCEDURE — 99213 OFFICE O/P EST LOW 20 MIN: CPT | Mod: S$PBB,,, | Performed by: NURSE PRACTITIONER

## 2023-12-27 PROCEDURE — 1160F PR REVIEW ALL MEDS BY PRESCRIBER/CLIN PHARMACIST DOCUMENTED: ICD-10-PCS | Mod: CPTII,,, | Performed by: NURSE PRACTITIONER

## 2023-12-27 PROCEDURE — 1160F RVW MEDS BY RX/DR IN RCRD: CPT | Mod: CPTII,,, | Performed by: NURSE PRACTITIONER

## 2023-12-27 NOTE — PROGRESS NOTES
"  Occupational Therapy Daily Treatment Note     Date: 12/27/2023  Name: Moreno Berman  MRN: [id number]    Diagnosis:   Encounter Diagnosis   Name Primary?    Acute pain of right shoulder Yes       Referring Physician: Akin Adams NP    Evaluation Date: 10/24/2023  POC Cert dates: 12/6/2023 - 1/19/2024    Visit # / Visits authorized: 16 / 24  GALAVIZ visit number: 0  Time In: 9:50 AM  Time Out: 10:43 AM   Total Billable Time:53 min  Precautions:  Standard      Subjective     Pt reports: "Im good today."   he was compliant with home exercise program given last session.     Pain: 0/10  Location: right shoulder      Objective     Treatment consist of the following:     Moreno received the following supervised modalities after being cleared for contradictions:   - CP to decrease pain and soreness post session. (Not today)    Moreno participated in dynamic functional therapeutic activities to improve functional performance, including:    - Patient engaged in bilateral hand gripper ex with mod resistance for 2 sets x 30 reps seated to improve overall hand strength while completing ADL/IADL tasks. Patient needed rest breaks b/w sets.  Patient completed while tolerating MHP.     - Patient engaged in bilateral upper extrimity exercise completing 2 sets x 15 reps of scapular ret/protraction, wrist flex/extension, and supination/pronation using green Flexbar with seated to faciliate an increase in indep while completing activity of daily living tasks. Patient needed seated rest breaks b/w each set.  Patient completed while tolerating MHP.     -Patient engaged in OH pulley in shoulder flexion and scaption exercise x 3 min each way to increase ROM in bilateral shoulders for increase indep with dressing, grooming, and bathing ADL tasks.     -Patient performed bilateral omnicycle with 7 res exercise for 2 sets x 5 min with min rest breaks to facilitate an increase in strength, functional mobility, range of motion, and endurance " for increased indep, cardiopulmonary functions, and performance with activities of daily living.     -Patient completed 2 sets x 10 reps performing shoulder flexion, scaption, and abduction towel slides on tabletop seated to improve strength, endurance, and fx mobility for increase indep while completing ADL/IADL tasks.     -Shoulder ABCs 1 trial 2lb DB     -Patient completed 2 sets x 15 reps performing finger ladder in shoulder flexion and abduction on incline slope to improve strength, range of motion, endurance, and functional mobility for increase indep while completing ADL/IADL tasks.      Home Exercises and Education Provided     Education provided:   - Role of OT and OT POC  - Progress towards goals     Written Home Exercises Provided: Patient instructed to cont prior HEP.  Exercises were reviewed and Moreno was able to demonstrate them prior to the end of the session.  Moreno demonstrated good  understanding of the HEP provided.   .   See EMR under Media for exercises provided 10/24/2023.        Assessment     Patient would continue to benefit from skilled occupational therapy to increase pain free range of motion and decrease numbness and tingling. Patient able to complete all ax this day with min rest breaks due to pain and numbness. Pt with good form while completing exercise/activity this day. Pt reports he had MD apt today after therapy for shoulder. Pt reported most difficulty with shoulder ABC dumbbell activity. Occupational therapist /GAL[facility] collaboration to discuss POC, improvements, and d/c planning on todays date.      Moreno is progressing well towards his goals and there are no updates to goals at this time. Pt prognosis is Good.     Pt will continue to benefit from skilled OT intervention.  Patient continues to demonstrate limitation  with  ROM, Joint mobility, Stiffness, Decreased functional use, Impaired sensation, Decreased strength, and Continued pain     Goals:    New goals to be met in 6  weeks:  1) Pt will be independent and report performing HEP to maximize (R) shoulder functional capacity.   2) Pt will increase shoulder strength in all measured planes of motion to 4/5 with daily task.  3) Pt will report increased independence in ADLs.  4) Pt will report one degree less of pain with nonuse.  5) Pt will increase ability to move/handle objects with an increased score on FOTO >=67 (currently 65)   6) Pt will increase shoulder AROM in all measured planes of motion by 5-10 degrees with daily task.          Plan     Moreno to be treated by Occupational Therapy 2 times per week for 6 weeks to achieve the established goals.   Treatment to include Ultrasoud @ 3mHz, AAROM Mobilization of GH joint, PROM Home program, Ice, Moist heat, Strengthening Theraband Ex, UBE , and E- stim as well as any other treatments deemed necessary based on the patient's needs or progress.      Certification Dates: 12/6/2023 - 1/19/2024    Updates/Grading for next session: N/A      Curtis Bravo OT

## 2023-12-27 NOTE — PROGRESS NOTES
Subjective:      Follow up: RT shoulder:     Moreno Berman is a 43 y.o. left handed male who presents for follow up for chronic right shoulder pain, weakness and difficulty to lift the right arm. Previous MRI showed a partial-thickness tear of the supraspinatus tendon at the humeral head without tendon retraction. He presents today and states that therapy is helping with motion and strength. He states that he is seeing neurology ([facility] [place]) for the numbness and weakness in his right arm. He states that he has upcoming testing.  He rates his pain as 2/10. He states that he is able to lift the arm better than previous. He is overall having clinical improvement.      The following portions of the patient's history were reviewed and updated as appropriate: allergies, current medications, past family history, past medical history, past social history, past surgical history, and problem list.      Review of Systems   Constitutional: Negative.  Negative for fever.   Musculoskeletal:  Positive for joint pain.   Skin: Negative.    Neurological: Negative.    Psychiatric/Behavioral: Anxiety        Objective:   NVI  General:  alert, appears stated age, and cooperative   Gait:  Normal.       Right Shoulder  Bruising:   absent   Crepitus:  absent   Joint Tenderness:  lateral acromial, rotator cuff - mild   Active ROM:   Improved in all planes. Still not end range.    Neer:   positive- mild   [place]  negative   Speeds negative   Cross arm negative   Empty can Positive- mild   Drop arm Weakness noted as previous.    Stability:   normal   Apprehension Sign:   negative   Atrophy:   none noted   Strength:  biceps 4/5, triceps 4/5, abduction 4/5, adduction 4/5     Contralateral shoulder was without deficit.   Brisk refill noted.       Imaging  X-Ray: Reviewed of the right shoulder 09/20/23  No evidence of a fracture or dislocation     MR shoulder RT:   Impression:     Partial-thickness tear of the supraspinatus tendon at the humeral  head without tendon retraction or muscle atrophy detected.     Assessment:      RT shoulder pain, partial tear supraspinatus  RT upper extremity weakness- mononeuritis     Plan:      He is making clinical progress with OT with ROM and strengthening. He is presently seeing neurology for the RT UE mononeuritis and weakness.   Continue Home physician directed RT shoulder HEP and OT for modalities as directed.   Mobic as previous.   Ice and over head rest as directed.  RTC 4 weeks for follow up. Again, will make referral to  if needed for any regression.   He had no further questions.

## 2023-12-29 ENCOUNTER — CLINICAL SUPPORT (OUTPATIENT)
Dept: REHABILITATION | Facility: HOSPITAL | Age: 43
End: 2023-12-29
Payer: MEDICAID

## 2023-12-29 DIAGNOSIS — M25.511 ACUTE PAIN OF RIGHT SHOULDER: Primary | ICD-10-CM

## 2023-12-29 PROCEDURE — 97530 THERAPEUTIC ACTIVITIES: CPT

## 2023-12-29 NOTE — PROGRESS NOTES
"  Occupational Therapy Daily Treatment Note     Date: 12/29/2023  Name: Moreno Berman  MRN: [id number]    Diagnosis:   Encounter Diagnosis   Name Primary?    Acute pain of right shoulder Yes       Referring Physician: Akin Adams NP    Evaluation Date: 10/24/2023  POC Cert dates: 12/6/2023 - 1/19/2024    Visit # / Visits authorized: 17 / 24  GALAVIZ visit number: 0  Time In: 9:35 AM  Time Out: 10:35 AM   Total Billable Time: 60 min  Precautions:  Standard      Subjective     Pt reports: "Im hurting a little today."   he was compliant with home exercise program given last session.     Pain: 7/10  Location: right shoulder      Objective     Treatment consist of the following:     Moreno received the following supervised modalities after being cleared for contradictions:   - CP to decrease pain and soreness post session. (Not today)    Moreno participated in dynamic functional therapeutic activities to improve functional performance, including:    - Patient engaged in bilateral hand gripper ex with mod resistance for 2 sets x 30 reps seated to improve overall hand strength while completing ADL/IADL tasks. Patient needed rest breaks b/w sets.  Patient completed while tolerating MHP.     - Patient engaged in bilateral upper extrimity exercise completing 2 sets x 15 reps of scapular ret/protraction, wrist flex/extension, and supination/pronation using green Flexbar with seated to faciliate an increase in indep while completing activity of daily living tasks. Patient needed seated rest breaks b/w each set.  Patient completed while tolerating MHP.     -Patient engaged in OH pulley in shoulder flexion and scaption exercise x 3 min each way to increase ROM in bilateral shoulders for increase indep with dressing, grooming, and bathing ADL tasks.     -Patient performed bilateral omnicycle with 7 res exercise for 2 sets x 5 min with min rest breaks to facilitate an increase in strength, functional mobility, range of motion, " and endurance for increased indep, cardiopulmonary functions, and performance with activities of daily living.     -Patient completed 2 sets x 10 reps performing shoulder flexion, scaption, and abduction towel slides on tabletop seated to improve strength, endurance, and fx mobility for increase indep while completing ADL/IADL tasks.     -Shoulder ABCs 1 trial 2lb DB     -Patient completed 2 sets x 15 reps performing finger ladder in shoulder flexion and abduction on incline slope to improve strength, range of motion, endurance, and functional mobility for increase indep while completing ADL/IADL tasks.      Home Exercises and Education Provided     Education provided:   - Role of OT and OT POC  - Progress towards goals     Written Home Exercises Provided: Patient instructed to cont prior HEP.  Exercises were reviewed and Moreno was able to demonstrate them prior to the end of the session.  Moreno demonstrated good  understanding of the HEP provided.   .   See EMR under Media for exercises provided 10/24/2023.        Assessment     Patient would continue to benefit from skilled occupational therapy to increase pain free range of motion and decrease numbness and tingling. Patient able to complete all ax this day with min rest breaks due to pain and numbness. Pt with good form while completing exercise/activity this day. Pt reported most difficulty with shoulder ABC dumbbell activity. Pt reports more pain this visit than last. Occupational therapist /GAL[facility]IZ collaboration to discuss POC, improvements, and d/c planning on todays date.      Moreno is progressing well towards his goals and there are no updates to goals at this time. Pt prognosis is Good.     Pt will continue to benefit from skilled OT intervention.  Patient continues to demonstrate limitation  with  ROM, Joint mobility, Stiffness, Decreased functional use, Impaired sensation, Decreased strength, and Continued pain     Goals:    New goals to be met in 6 weeks:  1)  Pt will be independent and report performing HEP to maximize (R) shoulder functional capacity.   2) Pt will increase shoulder strength in all measured planes of motion to 4/5 with daily task.  3) Pt will report increased independence in ADLs.  4) Pt will report one degree less of pain with nonuse.  5) Pt will increase ability to move/handle objects with an increased score on FOTO >=67 (currently 65)   6) Pt will increase shoulder AROM in all measured planes of motion by 5-10 degrees with daily task.          Plan     Moreno to be treated by Occupational Therapy 2 times per week for 6 weeks to achieve the established goals.   Treatment to include Ultrasoud @ 3mHz, AAROM Mobilization of GH joint, PROM Home program, Ice, Moist heat, Strengthening Theraband Ex, UBE , and E- stim as well as any other treatments deemed necessary based on the patient's needs or progress.      Certification Dates: 12/6/2023 - 1/19/2024    Updates/Grading for next session: N/A      Curtis Bravo OT

## 2024-01-03 ENCOUNTER — CLINICAL SUPPORT (OUTPATIENT)
Dept: REHABILITATION | Facility: HOSPITAL | Age: 44
End: 2024-01-03
Payer: MEDICAID

## 2024-01-03 DIAGNOSIS — M25.511 ACUTE PAIN OF RIGHT SHOULDER: Primary | ICD-10-CM

## 2024-01-03 PROCEDURE — 97530 THERAPEUTIC ACTIVITIES: CPT | Mod: CO

## 2024-01-03 NOTE — PROGRESS NOTES
"  Occupational Therapy Daily Treatment Note     Date: 1/3/2024  Name: Moreno Berman  MRN: [id number]    Diagnosis:   Encounter Diagnosis   Name Primary?    Acute pain of right shoulder Yes       Referring Physician: Akin Adams NP    Evaluation Date: 10/24/2023  POC Cert dates: 12/6/2023 - 1/19/2024    Visit # / Visits authorized: 18 / 24  GALAVIZ visit number: 1  Time In: 10:56 AM  Time Out: 11:51 AM   Total Billable Time: 55 min  Precautions:  Standard      Subjective     Pt reports: "Im hurting a today, I slept on it."   he was compliant with home exercise program given last session.     Pain: 7/10  Location: right shoulder      Objective     Treatment consist of the following:     Moreno received the following supervised modalities after being cleared for contradictions:   - CP to decrease pain and soreness post session. (Not today)    Moreno participated in dynamic functional therapeutic activities to improve functional performance, including:    - Patient engaged in bilateral hand gripper ex with mod resistance for 2 sets x 30 reps seated to improve overall hand strength while completing ADL/IADL tasks. Patient needed rest breaks b/w sets.  Patient completed while tolerating MHP.     - Patient engaged in bilateral upper extrimity exercise completing 2 sets x 15 reps of scapular ret/protraction, wrist flex/extension, and supination/pronation using green Flexbar with seated to faciliate an increase in indep while completing activity of daily living tasks. Patient needed seated rest breaks b/w each set.  Patient completed while tolerating MHP.     -Patient engaged in OH pulley in shoulder flexion and scaption exercise x 3 min each way to increase ROM in bilateral shoulders for increase indep with dressing, grooming, and bathing ADL tasks.     -Patient performed bilateral omnicycle with 10 res exercise for 2 sets x 5 min with min rest breaks to facilitate an increase in strength, functional mobility, range of " motion, and endurance for increased indep, cardiopulmonary functions, and performance with activities of daily living.     -Patient completed 2 sets x 10 reps performing shoulder flexion, scaption, and abduction towel slides on tabletop seated to improve strength, endurance, and fx mobility for increase indep while completing ADL/IADL tasks.     -Shoulder ABCs 1 trial 2lb DB     -Patient completed 2 sets x 15 reps performing finger ladder in shoulder flexion and abduction on incline slope to improve strength, range of motion, endurance, and functional mobility for increase indep while completing ADL/IADL tasks.      Home Exercises and Education Provided     Education provided:   - Role of OT and OT POC  - Progress towards goals     Written Home Exercises Provided: Patient instructed to cont prior HEP.  Exercises were reviewed and Moreno was able to demonstrate them prior to the end of the session.  Moreno demonstrated good  understanding of the HEP provided.   .   See EMR under Media for exercises provided 10/24/2023.        Assessment     Patient would continue to benefit from skilled occupational therapy to increase pain free range of motion and decrease numbness and tingling. Patient able to complete all ax this day with min rest breaks due to pain and numbness. Pt with good form while completing exercise/activity this day. Pt able to increase resistance on omnicycle this day with no rest breaks needed as well as no pain increase. Occupational therapist /GAL[facility] collaboration to discuss POC, improvements, and d/c planning on todays date.      Moreno is progressing well towards his goals and there are no updates to goals at this time. Pt prognosis is Good.     Pt will continue to benefit from skilled OT intervention.  Patient continues to demonstrate limitation  with  ROM, Joint mobility, Stiffness, Decreased functional use, Impaired sensation, Decreased strength, and Continued pain     Goals:    New goals to be met in 6  weeks:  1) Pt will be independent and report performing HEP to maximize (R) shoulder functional capacity.   2) Pt will increase shoulder strength in all measured planes of motion to 4/5 with daily task.  3) Pt will report increased independence in ADLs.  4) Pt will report one degree less of pain with nonuse.  5) Pt will increase ability to move/handle objects with an increased score on FOTO >=67 (currently 65)   6) Pt will increase shoulder AROM in all measured planes of motion by 5-10 degrees with daily task.          Plan     Moreno to be treated by Occupational Therapy 2 times per week for 6 weeks to achieve the established goals.   Treatment to include Ultrasoud @ 3mHz, AAROM Mobilization of GH joint, PROM Home program, Ice, Moist heat, Strengthening Theraband Ex, UBE , and E- stim as well as any other treatments deemed necessary based on the patient's needs or progress.      Certification Dates: 12/6/2023 - 1/19/2024    Updates/Grading for next session: N/A      BETH Wheat

## 2024-01-05 ENCOUNTER — CLINICAL SUPPORT (OUTPATIENT)
Dept: REHABILITATION | Facility: HOSPITAL | Age: 44
End: 2024-01-05
Payer: MEDICAID

## 2024-01-05 DIAGNOSIS — M25.511 ACUTE PAIN OF RIGHT SHOULDER: Primary | ICD-10-CM

## 2024-01-05 PROCEDURE — 97530 THERAPEUTIC ACTIVITIES: CPT

## 2024-01-05 NOTE — PROGRESS NOTES
"  Occupational Therapy Daily Treatment Note     Date: 1/5/2024  Name: Moreno Berman  MRN: [id number]    Diagnosis:   Encounter Diagnosis   Name Primary?    Acute pain of right shoulder Yes         Referring Physician: Akin Adams NP    Evaluation Date: 10/24/2023  POC Cert dates: 12/6/2023 - 1/19/2024    Visit # / Visits authorized: 19 / 24  GALAVIZ visit number: 0  Time In: 9:50 AM  Time Out: 10:49 AM   Total Billable Time: 59 min  Precautions:  Standard      Subjective     Pt reports: "Im ok."   he was compliant with home exercise program given last session.     Pain: 4/10  Location: right shoulder      Objective     Treatment consist of the following:     Moreno received the following supervised modalities after being cleared for contradictions:   - CP to decrease pain and soreness post session. (Not today)    Moreno participated in dynamic functional therapeutic activities to improve functional performance, including:    - Patient engaged in bilateral hand gripper ex with mod resistance for 2 sets x 30 reps seated to improve overall hand strength while completing ADL/IADL tasks. Patient needed rest breaks b/w sets.  Patient completed while tolerating MHP.     - Patient engaged in bilateral upper extrimity exercise completing 2 sets x 15 reps of scapular ret/protraction, wrist flex/extension, and supination/pronation using blue Flexbar with seated to faciliate an increase in indep while completing activity of daily living tasks. Patient needed seated rest breaks b/w each set.  Patient completed while tolerating MHP.     -Patient engaged in OH pulley in shoulder flexion and scaption exercise x 3 min each way to increase ROM in bilateral shoulders for increase indep with dressing, grooming, and bathing ADL tasks.     -Patient performed bilateral omnicycle with 10 res exercise for 2 sets x 5 min with min rest breaks to facilitate an increase in strength, functional mobility, range of motion, and endurance for " increased indep, cardiopulmonary functions, and performance with activities of daily living.     -Patient completed 2 sets x 10 reps performing shoulder flexion, scaption, and abduction towel slides on tabletop seated to improve strength, endurance, and fx mobility for increase indep while completing ADL/IADL tasks.     -Shoulder ABCs 1 trial 3lb DB     - patient completed wall clocks with green theraband 3x10       Home Exercises and Education Provided     Education provided:   - Role of OT and OT POC  - Progress towards goals     Written Home Exercises Provided: Patient instructed to cont prior HEP.  Exercises were reviewed and Moreno was able to demonstrate them prior to the end of the session.  Moreno demonstrated good  understanding of the HEP provided.   .   See EMR under Media for exercises provided 10/24/2023.        Assessment     Patient would continue to benefit from skilled occupational therapy to increase pain free range of motion and decrease numbness and tingling. Patient able to complete all ax this day with min rest breaks due to pain. Pt able to complete wall clocks with theraband today with mod rest breaks due to increased pain. Occupational therapist /GALAVIZ collaboration to discuss POC, improvements, and d/c planning on todays date.      Moreno is progressing well towards his goals and there are no updates to goals at this time. Pt prognosis is Good.     Pt will continue to benefit from skilled OT intervention.  Patient continues to demonstrate limitation  with  ROM, Joint mobility, Stiffness, Decreased functional use, Impaired sensation, Decreased strength, and Continued pain     Goals:    New goals to be met in 6 weeks:  1) Pt will be independent and report performing HEP to maximize (R) shoulder functional capacity.   2) Pt will increase shoulder strength in all measured planes of motion to 4/5 with daily task.  3) Pt will report increased independence in ADLs.  4) Pt will report one degree less of  pain with nonuse.  5) Pt will increase ability to move/handle objects with an increased score on FOTO >=67 (currently 65)   6) Pt will increase shoulder AROM in all measured planes of motion by 5-10 degrees with daily task.          Plan     Moreno to be treated by Occupational Therapy 2 times per week for 6 weeks to achieve the established goals.   Treatment to include Ultrasoud @ 3mHz, AAROM Mobilization of GH joint, PROM Home program, Ice, Moist heat, Strengthening Theraband Ex, UBE , and E- stim as well as any other treatments deemed necessary based on the patient's needs or progress.      Certification Dates: 12/6/2023 - 1/19/2024    Updates/Grading for next session: N/A      Curtis Bravo OT

## 2024-01-10 ENCOUNTER — CLINICAL SUPPORT (OUTPATIENT)
Dept: REHABILITATION | Facility: HOSPITAL | Age: 44
End: 2024-01-10
Payer: MEDICAID

## 2024-01-10 DIAGNOSIS — R29.898 WEAKNESS OF SHOULDER: ICD-10-CM

## 2024-01-10 DIAGNOSIS — M67.911 DISORDER OF RIGHT ROTATOR CUFF: ICD-10-CM

## 2024-01-10 DIAGNOSIS — M25.511 ACUTE PAIN OF RIGHT SHOULDER: Primary | ICD-10-CM

## 2024-01-10 PROCEDURE — 97530 THERAPEUTIC ACTIVITIES: CPT | Mod: CO

## 2024-01-10 NOTE — PROGRESS NOTES
"  Occupational Therapy Daily Treatment Note     Date: 1/10/2024  Name: Moreno Berman  MRN: [id number]    Diagnosis:   Encounter Diagnoses   Name Primary?    Acute pain of right shoulder Yes    Disorder of right rotator cuff     Weakness of shoulder          Referring Physician: Akin Adams NP    Evaluation Date: 10/24/2023  POC Cert dates: 12/6/2023 - 1/19/2024    Visit # / Visits authorized: 20 / 24  GALAVIZ visit number: 1  Time In: 9:45 AM  Time Out: 10:45 AM   Total Billable Time: 60 min  Precautions:  Standard      Subjective     Pt reports: "Im good."   he was compliant with home exercise program given last session.     Pain: 0/10  Location: right shoulder      Objective     Treatment consist of the following:     Moreno received the following supervised modalities after being cleared for contradictions:   - CP to decrease pain and soreness post session. (Not today)    Moreno participated in dynamic functional therapeutic activities to improve functional performance, including:    - Patient engaged in bilateral hand gripper ex with mod resistance for 2 sets x 30 reps seated to improve overall hand strength while completing ADL/IADL tasks. Patient needed rest breaks b/w sets.  Patient completed while tolerating MHP.     - Patient engaged in bilateral upper extrimity exercise completing 2 sets x 15 reps of scapular ret/protraction, wrist flex/extension, and supination/pronation using blue Flexbar with seated to faciliate an increase in indep while completing activity of daily living tasks. Patient needed seated rest breaks b/w each set.  Patient completed while tolerating MHP.     -Patient performed bilateral omnicycle with 10 res exercise for 2 sets x 5 min with min rest breaks to facilitate an increase in strength, functional mobility, range of motion, and endurance for increased indep, cardiopulmonary functions, and performance with activities of daily living.     - Patient performed bilateral dumb bell " exercise seated with 2 lb for 2 sets x 15 reps in the following planes: elbow flex/ext, shoulder flex/ext, scapular pro/retraction, scapular elevation/depression to facilitate an increase in strength, endurance, overall performance with ADL/IADLs. Rest breaks needed after each set.     -Shoulder ABCs 1 trial 3lb DB     - patient completed wall clocks with green theraband 3x10       Home Exercises and Education Provided     Education provided:   - Role of OT and OT POC  - Progress towards goals     Written Home Exercises Provided: Patient instructed to cont prior HEP.  Exercises were reviewed and Moreno was able to demonstrate them prior to the end of the session.  Moreno demonstrated good  understanding of the HEP provided.   .   See EMR under Media for exercises provided 10/24/2023.        Assessment     Patient would continue to benefit from skilled occupational therapy to increase pain free range of motion and decrease numbness and tingling. Patient able to complete all ax this day with min rest breaks due to pain. Pt able to complete wall clocks with theraband today with mod rest breaks due to increased pain again this day.Pt to increase resistance on omnicycle next visit depending on pain. Occupational therapist /GAL[facility] collaboration to discuss POC, improvements, and d/c planning on todays date.      Moreno is progressing well towards his goals and there are no updates to goals at this time. Pt prognosis is Good.     Pt will continue to benefit from skilled OT intervention.  Patient continues to demonstrate limitation  with  ROM, Joint mobility, Stiffness, Decreased functional use, Impaired sensation, Decreased strength, and Continued pain     Goals:    New goals to be met in 6 weeks:  1) Pt will be independent and report performing HEP to maximize (R) shoulder functional capacity.   2) Pt will increase shoulder strength in all measured planes of motion to 4/5 with daily task.  3) Pt will report increased independence  in ADLs.  4) Pt will report one degree less of pain with nonuse.  5) Pt will increase ability to move/handle objects with an increased score on FOTO >=67 (currently 65)   6) Pt will increase shoulder AROM in all measured planes of motion by 5-10 degrees with daily task.          Plan     Moreno to be treated by Occupational Therapy 2 times per week for 6 weeks to achieve the established goals.   Treatment to include Ultrasoud @ 3mHz, AAROM Mobilization of GH joint, PROM Home program, Ice, Moist heat, Strengthening Theraband Ex, UBE , and E- stim as well as any other treatments deemed necessary based on the patient's needs or progress.      Certification Dates: 12/6/2023 - 1/19/2024    Updates/Grading for next session: N/A      ANASTACIA Wheat/IAIN

## 2024-01-12 ENCOUNTER — CLINICAL SUPPORT (OUTPATIENT)
Dept: REHABILITATION | Facility: HOSPITAL | Age: 44
End: 2024-01-12
Payer: MEDICAID

## 2024-01-12 DIAGNOSIS — M67.911 DISORDER OF RIGHT ROTATOR CUFF: ICD-10-CM

## 2024-01-12 DIAGNOSIS — M25.511 ACUTE PAIN OF RIGHT SHOULDER: Primary | ICD-10-CM

## 2024-01-12 DIAGNOSIS — R29.898 WEAKNESS OF SHOULDER: ICD-10-CM

## 2024-01-12 PROCEDURE — 97530 THERAPEUTIC ACTIVITIES: CPT | Mod: CO

## 2024-01-12 NOTE — PROGRESS NOTES
"  Occupational Therapy Daily Treatment Note     Date: 1/12/2024  Name: Moreno Berman  MRN: [id number]    Diagnosis:   Encounter Diagnoses   Name Primary?    Acute pain of right shoulder Yes    Disorder of right rotator cuff     Weakness of shoulder          Referring Physician: Akin Adams NP    Evaluation Date: 10/24/2023  POC Cert dates: 12/6/2023 - 1/19/2024    Visit # / Visits authorized: 20 / 24  GALAVIZ visit number: 1  Time In: 9:55 AM  Time Out: 10:50 AM   Total Billable Time: 55 min  Precautions:  Standard      Subjective     Pt reports: "Im good."   he was compliant with home exercise program given last session.     Pain: 0/10  Location: right shoulder      Objective     Treatment consist of the following:     Moreno received the following supervised modalities after being cleared for contradictions:   - CP to decrease pain and soreness post session. (Not today)    Moreno participated in dynamic functional therapeutic activities to improve functional performance, including:    - Patient engaged in bilateral hand gripper ex with mod resistance for 2 sets x 30 reps seated to improve overall hand strength while completing ADL/IADL tasks. Patient needed rest breaks b/w sets.  Patient completed while tolerating MHP.     - Patient engaged in bilateral upper extrimity exercise completing 2 sets x 15 reps of scapular ret/protraction, wrist flex/extension, and supination/pronation using blue Flexbar with seated to faciliate an increase in indep while completing activity of daily living tasks. Patient needed seated rest breaks b/w each set.  Patient completed while tolerating MHP.     -Patient performed bilateral omnicycle with 10 res exercise for 2 sets x 5 min with min rest breaks to facilitate an increase in strength, functional mobility, range of motion, and endurance for increased indep, cardiopulmonary functions, and performance with activities of daily living.     - Patient performed bilateral dumb bell " exercise seated with 3 lb for 2 sets x 15 reps in the following planes: elbow flex/ext, shoulder flex/ext, scapular pro/retraction, scapular elevation/depression to facilitate an increase in strength, endurance, overall performance with ADL/IADLs. Rest breaks needed after each set.     -Shoulder ABCs 1 trial 3lb DB     - patient completed wall clocks with green theraband 2x10       Home Exercises and Education Provided     Education provided:   - Role of OT and OT POC  - Progress towards goals     Written Home Exercises Provided: Patient instructed to cont prior HEP.  Exercises were reviewed and Moreno was able to demonstrate them prior to the end of the session.  Moreno demonstrated good  understanding of the HEP provided.   .   See EMR under Media for exercises provided 10/24/2023.        Assessment     Patient would continue to benefit from skilled occupational therapy to increase pain free range of motion and decrease numbness and tingling. Patient able to complete all ax this day with min rest breaks due to pain. Pt able to complete wall clocks with theraband today with less rest breaks due to increased pain again this day. Pt continues to report most difficult was wall clock. Occupational therapist /GAL[facility] collaboration to discuss POC, improvements, and d/c planning on todays date.      Moreno is progressing well towards his goals and there are no updates to goals at this time. Pt prognosis is Good.     Pt will continue to benefit from skilled OT intervention.  Patient continues to demonstrate limitation  with  ROM, Joint mobility, Stiffness, Decreased functional use, Impaired sensation, Decreased strength, and Continued pain     Goals:    New goals to be met in 6 weeks:  1) Pt will be independent and report performing HEP to maximize (R) shoulder functional capacity.   2) Pt will increase shoulder strength in all measured planes of motion to 4/5 with daily task.  3) Pt will report increased independence in ADLs.  4)  Pt will report one degree less of pain with nonuse.  5) Pt will increase ability to move/handle objects with an increased score on FOTO >=67 (currently 65)   6) Pt will increase shoulder AROM in all measured planes of motion by 5-10 degrees with daily task.          Plan     Moreno to be treated by Occupational Therapy 2 times per week for 6 weeks to achieve the established goals.   Treatment to include Ultrasoud @ 3mHz, AAROM Mobilization of GH joint, PROM Home program, Ice, Moist heat, Strengthening Theraband Ex, UBE , and E- stim as well as any other treatments deemed necessary based on the patient's needs or progress.      Certification Dates: 12/6/2023 - 1/19/2024    Updates/Grading for next session: N/A      ANASTACIA Wheat/IAIN

## 2024-01-19 ENCOUNTER — CLINICAL SUPPORT (OUTPATIENT)
Dept: REHABILITATION | Facility: HOSPITAL | Age: 44
End: 2024-01-19
Payer: MEDICAID

## 2024-01-19 DIAGNOSIS — M25.511 ACUTE PAIN OF RIGHT SHOULDER: Primary | ICD-10-CM

## 2024-01-19 PROCEDURE — 97530 THERAPEUTIC ACTIVITIES: CPT

## 2024-01-19 NOTE — PROGRESS NOTES
"  Occupational Therapy Daily Treatment/ Progress Note     Date: 1/19/2024  Name: Moreno Berman  MRN: [id number]    Diagnosis:   Encounter Diagnosis   Name Primary?    Acute pain of right shoulder Yes         Referring Physician: Akin Adams NP    Evaluation Date: 10/24/2023  POC Cert dates: 1/19/2024 - 3/1/2024    Visit # / Visits authorized: 22 / 24  GALAVIZ visit number: 0  Time In: 9:48 AM  Time Out: 10:42 AM   Total Billable Time: 53 min  Precautions:  Standard      Subjective     Pt reports: "Im good."   he was compliant with home exercise program given last session.     Pain: 0/10  Location: right shoulder      Objective     Range of Motion:   Right: limited as follows: (see measurements table below)  Left: WNL       (R) UE       AROM Norm       0-180   Shoulder Flexion 80 120 140 180   Shoulder Abduction 85 110 115 0-180   Shoulder Extension 45  45 0-50   Shoulder Internal Rotation 75 85 85 0-90   Shoulder External Rotation 75  85  85 0-90      ROM Comments:   Pain at end range        Strength  Shoulder Flexion RUE: 3+/5   Shoulder Extension RUE: 3+/5   Shoulder Abduction RUE:  3+/5   Shoulder Adduction RUE:  3+/5   Internal Rotation RUE: 3+/5   External Rotation RUE: 3+/5   Horizontal Adduction RUE: 3+/5   Shoulder Flexion LUE: 5/5   Shoulder Extension LUE: 5/5   Shoulder Abduction LUE: 5/5   Shoulder Abbduction LUE: 5/5   Internal Rotation LUE:  5/5   External Rotation LUE:  5/5   Horizontal Adduction LUE:  5/5       Palpation: (for pain)     Positive: Supraspinatus Region     Limitations of Functional Status:   Self Care: requires increase time to don clothes, mopping, sweeping, and reaching overhead  Work: lifting, pushing  Leisure: driving     FOTO: 65 74    Treatment consist of the following:     Moreno received the following supervised modalities after being cleared for contradictions:   - CP to decrease pain and soreness post session. (Not today)    Moreno participated in dynamic functional " therapeutic activities to improve functional performance, including:    - Patient engaged in bilateral hand gripper ex with mod resistance for 2 sets x 30 reps seated to improve overall hand strength while completing ADL/IADL tasks. Patient needed rest breaks b/w sets.  Patient completed while tolerating MHP.     - Patient engaged in bilateral upper extrimity exercise completing 2 sets x 15 reps of scapular ret/protraction, wrist flex/extension, and supination/pronation using blue Flexbar with seated to faciliate an increase in indep while completing activity of daily living tasks. Patient needed seated rest breaks b/w each set.  Patient completed while tolerating MHP.     -Patient performed bilateral omnicycle with 10 res exercise for 2 sets x 5 min with min rest breaks to facilitate an increase in strength, functional mobility, range of motion, and endurance for increased indep, cardiopulmonary functions, and performance with activities of daily living.     - Patient performed bilateral dumb bell exercise seated with 3 lb for 2 sets x 15 reps in the following planes: elbow flex/ext, shoulder flex/ext, scapular pro/retraction, scapular elevation/depression to facilitate an increase in strength, endurance, overall performance with ADL/IADLs. Rest breaks needed after each set.     -Shoulder ABCs 1 trial 3lb DB     - patient completed wall clocks with green theraband 2x10       Home Exercises and Education Provided     Education provided:   - Role of OT and OT POC  - Progress towards goals     Written Home Exercises Provided: Patient instructed to cont prior HEP.  Exercises were reviewed and Moreno was able to demonstrate them prior to the end of the session.  Moreno demonstrated good  understanding of the HEP provided.   .   See EMR under Media for exercises provided 10/24/2023.        Assessment     Patient would continue to benefit from skilled occupational therapy to increase pain free range of motion and decrease  numbness and tingling. Patient able to complete all ax this day with min rest breaks due to pain. Pt continues to report most difficult was wall clock. Patient objective measurements taken and goals updated below. Patient is progressing well with range of motion in RUE and would benefit from continued OT to address range of motion and strength deficits in order to complete activities of daily living independently. Occupational therapist /GAL[facility] collaboration to discuss POC, improvements, and d/c planning on todays date.      Moreno is progressing well towards his goals and there are no updates to goals at this time. Pt prognosis is Good.     Pt will continue to benefit from skilled OT intervention.  Patient continues to demonstrate limitation  with  ROM, Joint mobility, Stiffness, Decreased functional use, Impaired sensation, Decreased strength, and Continued pain     Goals:    Goals to be met in 6 weeks:  1) Pt will be independent and report performing HEP to maximize (R) shoulder functional capacity.  Ongoing/Progressing  2) Pt will increase shoulder strength in all measured planes of motion to 4/5 with daily task. Ongoing/Progressing  3) Pt will report increased independence in ADLs.Ongoing/Progressing  4) Pt will report one degree less of pain with nonuse. Ongoing/Progressing  5) Pt will increase ability to move/handle objects with an increased score on FOTO >=67 (currently 65) MET  6) Pt will increase shoulder AROM in all measured planes of motion by 5-10 degrees with daily task. Ongoing/Progressing    New goals to be met in 6 weeks:  1) Pt will be independent and report performing HEP to maximize (R) shoulder functional capacity.    2) Pt will increase shoulder strength in all measured planes of motion to 4/5 with daily task.   3) Pt will report increased independence in ADLs.  4) Pt will report one degree less of pain with nonuse.   6) Pt will increase shoulder AROM in all measured planes of motion by 5-10  degrees with daily task.     Plan     Moreno to be treated by Occupational Therapy 1 times per week for 6 weeks to achieve the established goals.   Treatment to include Ultrasoud @ 3mHz, AAROM Mobilization of GH joint, PROM Home program, Ice, Moist heat, Strengthening Theraband Ex, UBE , and E- stim as well as any other treatments deemed necessary based on the patient's needs or progress.      Certification Dates: 12/6/2023 - 1/19/2024    NEW OT POC:  Moreno to be treated by Occupational Therapy 1 times per week for 6 weeks to achieve the established goals.   Treatment to include Ultrasoud @ 3mHz, AAROM Mobilization of GH joint, PROM Home program, Ice, Moist heat, Strengthening Theraband Ex, UBE , and E- stim as well as any other treatments deemed necessary based on the patient's needs or progress.      Certification Dates: 1/19/2024 - 3/1/2024    Updates/Grading for next session: N/A      Curtis Bravo OT      I certify the need for these services furnished under this plan of treatment and while under my care     ____________________________________  Physician/Referring Practitioner     _______________  Date of Signature

## 2024-01-26 ENCOUNTER — CLINICAL SUPPORT (OUTPATIENT)
Dept: REHABILITATION | Facility: HOSPITAL | Age: 44
End: 2024-01-26
Payer: MEDICAID

## 2024-01-26 ENCOUNTER — OFFICE VISIT (OUTPATIENT)
Dept: ORTHOPEDICS | Facility: CLINIC | Age: 44
End: 2024-01-26
Payer: MEDICAID

## 2024-01-26 DIAGNOSIS — G56.91 MONONEURITIS ARM, RIGHT: ICD-10-CM

## 2024-01-26 DIAGNOSIS — M25.511 ACUTE PAIN OF RIGHT SHOULDER: Primary | ICD-10-CM

## 2024-01-26 DIAGNOSIS — M67.911 DISORDER OF RIGHT ROTATOR CUFF: ICD-10-CM

## 2024-01-26 PROCEDURE — 99212 OFFICE O/P EST SF 10 MIN: CPT | Mod: PBBFAC | Performed by: NURSE PRACTITIONER

## 2024-01-26 PROCEDURE — 1159F MED LIST DOCD IN RCRD: CPT | Mod: CPTII,,, | Performed by: NURSE PRACTITIONER

## 2024-01-26 PROCEDURE — 99999 PR PBB SHADOW E&M-EST. PATIENT-LVL II: CPT | Mod: PBBFAC,,, | Performed by: NURSE PRACTITIONER

## 2024-01-26 PROCEDURE — 97530 THERAPEUTIC ACTIVITIES: CPT

## 2024-01-26 PROCEDURE — 99213 OFFICE O/P EST LOW 20 MIN: CPT | Mod: S$PBB,,, | Performed by: NURSE PRACTITIONER

## 2024-01-26 PROCEDURE — 1160F RVW MEDS BY RX/DR IN RCRD: CPT | Mod: CPTII,,, | Performed by: NURSE PRACTITIONER

## 2024-01-26 NOTE — PROGRESS NOTES
Subjective:      Follow up: RT shoulder:     Moreno Berman is a 43 y.o. left handed male who presents for follow up for chronic right shoulder pain, weakness and difficulty to lift the right arm. Previous MRI showed a partial-thickness tear of the supraspinatus tendon at the humeral head without tendon retraction. He presents today and states that he is doing better. He states that he is making progress with therapy. He has improved ROM in all planes.  He states that he is seeing neurology ([facility] [place])  and states that he has carpal tunnel syndrome. He is scheduled for carpal injections.  He rates his pain as 0/10 today. He again states that he is able to lift the arm.     The following portions of the patient's history were reviewed and updated as appropriate: allergies, current medications, past family history, past medical history, past social history, past surgical history, and problem list.      Review of Systems   Constitutional: Negative.  Negative for fever.   Musculoskeletal:  Positive for joint pain.   Skin: Negative.    Neurological: Negative.    Psychiatric/Behavioral: Anxiety        Objective:   NVI  General:  alert, appears stated age, and cooperative   Gait:  Normal.       Right Shoulder  Bruising:   absent   Crepitus:  absent   Joint Tenderness:  lateral acromial, rotator cuff - minimal   Active ROM:   Improved in all planes. Still not end range.    Neer:   positive- mild   [place]  negative   Speeds negative   Cross arm negative   Empty can negative   Drop arm Weakness noted as previous.    Stability:   normal   Apprehension Sign:   negative   Atrophy:   none noted   Strength:  biceps 5/5, triceps 5/5, abduction 5/5, adduction 5/5      Contralateral shoulder was without deficit.   Brisk refill noted.       Imaging  None today  Assessment:      RT shoulder pain, partial tear supraspinatus  RT upper extremity weakness- mononeuritis     Plan:      He is making clinical progress with OT with ROM and  strengthening as previous. He will follow up with neurology for upcoming carpal injections.   Continue Home physician directed RT shoulder HEP and OT for modalities as directed. for the RT UE  Mobic as previous.   Ice and over head rest as directed.  RTC 4 weeks for follow up.  Again, will make referral to  if needed for any regression.   He had no further questions.

## 2024-01-26 NOTE — PROGRESS NOTES
"  Occupational Therapy Daily Treatment/ Progress Note     Date: 1/26/2024  Name: Moreno Berman  MRN: [id number]    Diagnosis:   Encounter Diagnosis   Name Primary?    Acute pain of right shoulder Yes       Referring Physician: Akin Adams NP    Evaluation Date: 10/24/2023  POC Cert dates: 1/19/2024 - 3/1/2024    Visit # / Visits authorized: 1 / 25  GALAVIZ visit number: 0  Time In: 9:40 AM  Time Out: 10:37 AM   Total Billable Time: 57 min  Precautions:  Standard      Subjective     Pt reports: "Im good."   he was compliant with home exercise program given last session.     Pain: 0/10  Location: right shoulder      Objective     Treatment consist of the following:     Moreno received the following supervised modalities after being cleared for contradictions:   - CP to decrease pain and soreness post session. (Not today)    Moreno participated in dynamic functional therapeutic activities to improve functional performance, including:    - Patient engaged in bilateral hand gripper ex with mod resistance for 2 sets x 30 reps seated to improve overall hand strength while completing ADL/IADL tasks. Patient needed rest breaks b/w sets.  Patient completed while tolerating MHP.     - Patient engaged in bilateral upper extrimity exercise completing 2 sets x 15 reps of scapular ret/protraction, wrist flex/extension, and supination/pronation using blue Flexbar with seated to faciliate an increase in indep while completing activity of daily living tasks. Patient needed seated rest breaks b/w each set.  Patient completed while tolerating MHP.     -Patient performed bilateral omnicycle with 10 res exercise for 2 sets x 5 min with min rest breaks to facilitate an increase in strength, functional mobility, range of motion, and endurance for increased indep, cardiopulmonary functions, and performance with activities of daily living.     - Patient performed bilateral dumb bell exercise seated with 3 lb for 2 sets x 15 reps in the " following planes: elbow flex/ext, shoulder flex/ext, scapular pro/retraction, scapular elevation/depression to facilitate an increase in strength, endurance, overall performance with ADL/IADLs. Rest breaks needed after each set.     -Shoulder ABCs 1 trial 3lb DB     - patient completed wall clocks with green theraband 2x10       Home Exercises and Education Provided     Education provided:   - Role of OT and OT POC  - Progress towards goals     Written Home Exercises Provided: Patient instructed to cont prior HEP.  Exercises were reviewed and Moreno was able to demonstrate them prior to the end of the session.  Moreno demonstrated good  understanding of the HEP provided.   .   See EMR under Media for exercises provided 10/24/2023.        Assessment     Patient would continue to benefit from skilled occupational therapy to increase pain free range of motion and decrease numbness and tingling. Patient able to complete all ax this day with min rest breaks due to pain. Pt tolerated increased resistance this day without complaints of pain. Pt continues to report most difficult was wall clock. Pt without pain post session. Occupational therapist /GALAVIZ collaboration to discuss POC, improvements, and d/c planning on todays date.      Moreno is progressing well towards his goals and there are no updates to goals at this time. Pt prognosis is Good.     Pt will continue to benefit from skilled OT intervention.  Patient continues to demonstrate limitation  with  ROM, Joint mobility, Stiffness, Decreased functional use, Impaired sensation, Decreased strength, and Continued pain     Goals:  New goals to be met in 6 weeks:  1) Pt will be independent and report performing HEP to maximize (R) shoulder functional capacity.    2) Pt will increase shoulder strength in all measured planes of motion to 4/5 with daily task.   3) Pt will report increased independence in ADLs.  4) Pt will report one degree less of pain with nonuse.   6) Pt will  increase shoulder AROM in all measured planes of motion by 5-10 degrees with daily task.     Plan     POC:  Moreno to be treated by Occupational Therapy 1 times per week for 6 weeks to achieve the established goals.   Treatment to include Ultrasoud @ 3mHz, AAROM Mobilization of GH joint, PROM Home program, Ice, Moist heat, Strengthening Theraband Ex, UBE , and E- stim as well as any other treatments deemed necessary based on the patient's needs or progress.      Certification Dates: 1/19/2024 - 3/1/2024    Updates/Grading for next session: N/A      Curtis Bravo OT

## 2024-02-02 ENCOUNTER — CLINICAL SUPPORT (OUTPATIENT)
Dept: REHABILITATION | Facility: HOSPITAL | Age: 44
End: 2024-02-02
Payer: MEDICAID

## 2024-02-02 DIAGNOSIS — M25.511 ACUTE PAIN OF RIGHT SHOULDER: Primary | ICD-10-CM

## 2024-02-02 PROCEDURE — 97530 THERAPEUTIC ACTIVITIES: CPT | Mod: CO

## 2024-02-02 NOTE — PROGRESS NOTES
"  Occupational Therapy Daily Treatment/ Progress Note     Date: 2/2/2024  Name: Moreno Berman  MRN: [id number]    Diagnosis:   Encounter Diagnosis   Name Primary?    Acute pain of right shoulder Yes       Referring Physician: Akin Adams NP    Evaluation Date: 10/24/2023  POC Cert dates: 1/19/2024 - 3/1/2024    Visit # / Visits authorized: 2 / 25  GALAVIZ visit number: 1  Time In: 9:55 AM  Time Out: 9:57 AM   Total Billable Time: 62 min  Precautions:  Standard      Subjective     Pt reports: "Im good."   he was compliant with home exercise program given last session.     Pain: 0/10  Location: right shoulder      Objective     Treatment consist of the following:     Moreno received the following supervised modalities after being cleared for contradictions:   - CP to decrease pain and soreness post session. (Not today)    Moreno participated in dynamic functional therapeutic activities to improve functional performance, including:    - Patient engaged in bilateral hand gripper ex with mod resistance for 2 sets x 30 reps seated to improve overall hand strength while completing ADL/IADL tasks. Patient needed rest breaks b/w sets.  Patient completed while tolerating MHP.     - Patient engaged in bilateral upper extrimity exercise completing 2 sets x 15 reps of scapular ret/protraction, wrist flex/extension, and supination/pronation using blue Flexbar with seated to faciliate an increase in indep while completing activity of daily living tasks. Patient needed seated rest breaks b/w each set.  Patient completed while tolerating MHP.     -Patient performed bilateral omnicycle with 10 res exercise for 2 sets x 5 min with min rest breaks to facilitate an increase in strength, functional mobility, range of motion, and endurance for increased indep, cardiopulmonary functions, and performance with activities of daily living.     - Patient performed bilateral dumb bell exercise seated with 3 lb for 2 sets x 15 reps in the " following planes: elbow flex/ext, shoulder flex/ext, scapular pro/retraction, scapular elevation/depression to facilitate an increase in strength, endurance, overall performance with ADL/IADLs. Rest breaks needed after each set.     -Shoulder ABCs 1 trial 3lb DB     - patient completed wall clocks with green theraband 2x10     - Body blade IR/ER and horizontal abduction/adduction x 30 sec bilateral x 2 trials each       Home Exercises and Education Provided     Education provided:   - Role of OT and OT POC  - Progress towards goals     Written Home Exercises Provided: Patient instructed to cont prior HEP.  Exercises were reviewed and Moreno was able to demonstrate them prior to the end of the session.  [place] demonstrated good  understanding of the HEP provided.   .   See EMR under Media for exercises provided 10/24/2023.        Assessment     Patient would continue to benefit from skilled occupational therapy to increase pain free range of motion and decrease numbness and tingling. Patient able to complete all ax this day with min rest breaks due to pain. Pt tolerated increased resistance with DB exercise  this day without complaints of pain. Pt without pain pre/post session. Pt also able to complete body blade this day well with no repots of increase pain. Pt request Biofreeze on affected shoulder to decrease soreness post session. Occupational therapist /GALAVIZ collaboration to discuss POC, improvements, and d/c planning on todays date.      Moreno is progressing well towards his goals and there are no updates to goals at this time. Pt prognosis is Good.     Pt will continue to benefit from skilled OT intervention.  Patient continues to demonstrate limitation  with  ROM, Joint mobility, Stiffness, Decreased functional use, Impaired sensation, Decreased strength, and Continued pain     Goals:  New goals to be met in 6 weeks:  1) Pt will be independent and report performing HEP to maximize (R) shoulder functional capacity.     2) Pt will increase shoulder strength in all measured planes of motion to 4/5 with daily task.   3) Pt will report increased independence in ADLs.  4) Pt will report one degree less of pain with nonuse.   6) Pt will increase shoulder AROM in all measured planes of motion by 5-10 degrees with daily task.     Plan     POC:  Moreno to be treated by Occupational Therapy 1 times per week for 6 weeks to achieve the established goals.   Treatment to include Ultrasoud @ 3mHz, AAROM Mobilization of GH joint, PROM Home program, Ice, Moist heat, Strengthening Theraband Ex, UBE , and E- stim as well as any other treatments deemed necessary based on the patient's needs or progress.      Certification Dates: 1/19/2024 - 3/1/2024    Updates/Grading for next session: N/A      ANASTACIA Wheat/IAIN

## 2024-02-09 ENCOUNTER — CLINICAL SUPPORT (OUTPATIENT)
Dept: REHABILITATION | Facility: HOSPITAL | Age: 44
End: 2024-02-09
Payer: MEDICAID

## 2024-02-09 DIAGNOSIS — M25.511 ACUTE PAIN OF RIGHT SHOULDER: Primary | ICD-10-CM

## 2024-02-09 PROCEDURE — 97530 THERAPEUTIC ACTIVITIES: CPT | Mod: CO

## 2024-02-09 NOTE — PROGRESS NOTES
"  Occupational Therapy Daily Treatment/ Progress Note     Date: 2/9/2024  Name: Moreno Berman  MRN: [id number]    Diagnosis:   Encounter Diagnosis   Name Primary?    Acute pain of right shoulder Yes       Referring Physician: Akin Adams NP    Evaluation Date: 10/24/2023  POC Cert dates: 1/19/2024 - 3/1/2024    Visit # / Visits authorized: 3 / 25  GALAVIZ visit number: 2  Time In: 10:00 AM  Time Out: 10:57 AM   Total Billable Time: 57 min  Precautions:  Standard      Subjective     Pt reports: "My hand is hurting today.."   he was compliant with home exercise program given last session.     Pain: 8/10  Location: right hand    Objective     Treatment consist of the following:     Moreno received the following supervised modalities after being cleared for contradictions:   - CP to decrease pain and soreness post session. (Not today)    Moreno participated in dynamic functional therapeutic activities to improve functional performance, including:    - Patient engaged in bilateral hand gripper ex with mod resistance for 2 sets x 30 reps seated to improve overall hand strength while completing ADL/IADL tasks. Patient needed rest breaks b/w sets.  Patient completed while tolerating MHP.     - Patient engaged in bilateral upper extrimity exercise completing 2 sets x 15 reps of scapular ret/protraction, wrist flex/extension, and supination/pronation using blue Flexbar with seated to faciliate an increase in indep while completing activity of daily living tasks. Patient needed seated rest breaks b/w each set.  Patient completed while tolerating MHP.     -Patient performed bilateral omnicycle with 10 res exercise for 2 sets x 5 min with min rest breaks to facilitate an increase in strength, functional mobility, range of motion, and endurance for increased indep, cardiopulmonary functions, and performance with activities of daily living.     - Patient performed bilateral dumb bell exercise seated with 3 lb for 2 sets x 15 " reps in the following planes: elbow flex/ext, shoulder flex/ext, scapular pro/retraction, scapular elevation/depression to facilitate an increase in strength, endurance, overall performance with ADL/IADLs. Rest breaks needed after each set.     -Shoulder ABCs 1 trial 3lb DB     - patient completed wall clocks with green theraband 2x10     - Body blade IR/ER and horizontal abduction/adduction x 30 sec bilateral x 2 trials each       Home Exercises and Education Provided     Education provided:   - Role of OT and OT POC  - Progress towards goals     Written Home Exercises Provided: Patient instructed to cont prior HEP.  Exercises were reviewed and Moreno was able to demonstrate them prior to the end of the session.  Moreno demonstrated good  understanding of the HEP provided.   .   See EMR under Media for exercises provided 10/24/2023.        Assessment     Patient would continue to benefit from skilled occupational therapy to increase pain free range of motion and decrease numbness and tingling. Patient able to complete all ax this day with min rest breaks due to pain. Due to hand pain, patient down graded in few tasks. Body blade not completed due to pain reported per patient was 8/10. Pt tolerated cryotherapy post session due to high pain. Pt reported he has MD apt next week.  Occupational therapist /GALAVIZ collaboration to discuss POC, improvements, and d/c planning on todays date.      Moreno is progressing well towards his goals and there are no updates to goals at this time. Pt prognosis is Good.     Pt will continue to benefit from skilled OT intervention.  Patient continues to demonstrate limitation  with  ROM, Joint mobility, Stiffness, Decreased functional use, Impaired sensation, Decreased strength, and Continued pain     Goals:  New goals to be met in 6 weeks:  1) Pt will be independent and report performing HEP to maximize (R) shoulder functional capacity.    2) Pt will increase shoulder strength in all measured  planes of motion to 4/5 with daily task.   3) Pt will report increased independence in ADLs.  4) Pt will report one degree less of pain with nonuse.   6) Pt will increase shoulder AROM in all measured planes of motion by 5-10 degrees with daily task.     Plan     POC:  Moreno to be treated by Occupational Therapy 1 times per week for 6 weeks to achieve the established goals.   Treatment to include Ultrasoud @ 3mHz, AAROM Mobilization of GH joint, PROM Home program, Ice, Moist heat, Strengthening Theraband Ex, UBE , and E- stim as well as any other treatments deemed necessary based on the patient's needs or progress.      Certification Dates: 1/19/2024 - 3/1/2024    Updates/Grading for next session: N/A      ANASTACIA Wheat/IAIN

## 2024-02-16 ENCOUNTER — CLINICAL SUPPORT (OUTPATIENT)
Dept: REHABILITATION | Facility: HOSPITAL | Age: 44
End: 2024-02-16
Payer: MEDICAID

## 2024-02-16 DIAGNOSIS — M25.511 ACUTE PAIN OF RIGHT SHOULDER: Primary | ICD-10-CM

## 2024-02-16 DIAGNOSIS — M67.911 DISORDER OF RIGHT ROTATOR CUFF: ICD-10-CM

## 2024-02-16 PROCEDURE — 97530 THERAPEUTIC ACTIVITIES: CPT | Mod: CO

## 2024-02-16 NOTE — PROGRESS NOTES
"  Occupational Therapy Daily Treatment/ Progress Note     Date: 2/16/2024  Name: Moreno Berman  MRN: [id number]    Diagnosis:   Encounter Diagnoses   Name Primary?    Acute pain of right shoulder Yes    Disorder of right rotator cuff        Referring Physician: Akni Adams NP    Evaluation Date: 10/24/2023  POC Cert dates: 1/19/2024 - 3/1/2024    Visit # / Visits authorized: 4 / 25  GALAVIZ visit number: 3  Time In: 9:45 AM  Time Out: 10:40 AM   Total Billable Time: 55 min    Precautions:  Standard      Subjective     Pt reports: "I had a shot in my wrist.."   he was compliant with home exercise program given last session.     Pain: 8/10  Location: right hand    Objective     Treatment consist of the following:     Moreno received the following supervised modalities after being cleared for contradictions:   - CP to decrease pain and soreness post session. (Not today)    Moreno participated in dynamic functional therapeutic activities to improve functional performance, including:    - Patient engaged in bilateral hand gripper ex with mod resistance for 2 sets x 30 reps seated to improve overall hand strength while completing ADL/IADL tasks. Patient needed rest breaks b/w sets.  Patient completed while tolerating MHP.     - Patient engaged in bilateral upper extrimity exercise completing 2 sets x 15 reps of scapular ret/protraction, wrist flex/extension, and supination/pronation using blue Flexbar with seated to faciliate an increase in indep while completing activity of daily living tasks. Patient needed seated rest breaks b/w each set.  Patient completed while tolerating MHP.     -Patient performed bilateral omnicycle with 10 res exercise for 2 sets x 5 min with min rest breaks to facilitate an increase in strength, functional mobility, range of motion, and endurance for increased indep, cardiopulmonary functions, and performance with activities of daily living.     - Patient performed bilateral dumb bell " exercise seated with 3 lb for 2 sets x 15 reps in the following planes: elbow flex/ext, shoulder flex/ext, scapular pro/retraction, scapular elevation/depression to facilitate an increase in strength, endurance, overall performance with ADL/IADLs. Rest breaks needed after each set.     -Shoulder ABCs 1 trial 3lb DB     - patient completed wall clocks with green theraband 2x10     - Body blade IR/ER and horizontal abduction/adduction x 30 sec bilateral x 2 trials each     - Shoulder flexion and abduction with green theraband 2 x 10 with 3 sec hold      Home Exercises and Education Provided     Education provided:   - Role of OT and OT POC  - Progress towards goals     Written Home Exercises Provided: Patient instructed to cont prior HEP.  Exercises were reviewed and Moreno was able to demonstrate them prior to the end of the session.  Moreno demonstrated good  understanding of the HEP provided.   .   See EMR under Media for exercises provided 10/24/2023.        Assessment     Patient would continue to benefit from skilled occupational therapy to increase pain free range of motion and decrease numbness and tingling. Patient able to complete all ax this day with min rest breaks due to pain. Due to hand pain, patient down graded in few tasks. Pt reported his shoulder has been doing good its more of his hands. Pt reported he has been getting steroid injections in wrist with only little benefits.  Pt tolerated cryotherapy post session due to high pain.  Occupational therapist /GALAVIZ collaboration to discuss POC, improvements, and d/c planning on todays date.      Moreno is progressing well towards his goals and there are no updates to goals at this time. Pt prognosis is Good.     Pt will continue to benefit from skilled OT intervention.  Patient continues to demonstrate limitation  with  ROM, Joint mobility, Stiffness, Decreased functional use, Impaired sensation, Decreased strength, and Continued pain     Goals:  New goals to  be met in 6 weeks:  1) Pt will be independent and report performing HEP to maximize (R) shoulder functional capacity.    2) Pt will increase shoulder strength in all measured planes of motion to 4/5 with daily task.   3) Pt will report increased independence in ADLs.  4) Pt will report one degree less of pain with nonuse.   6) Pt will increase shoulder AROM in all measured planes of motion by 5-10 degrees with daily task.     Plan     POC:  Moreno to be treated by Occupational Therapy 1 times per week for 6 weeks to achieve the established goals.   Treatment to include Ultrasoud @ 3mHz, AAROM Mobilization of GH joint, PROM Home program, Ice, Moist heat, Strengthening Theraband Ex, UBE , and E- stim as well as any other treatments deemed necessary based on the patient's needs or progress.      Certification Dates: 1/19/2024 - 3/1/2024    Updates/Grading for next session: N/A      ANASTACIA Wheat/IAIN

## 2024-02-23 ENCOUNTER — CLINICAL SUPPORT (OUTPATIENT)
Dept: REHABILITATION | Facility: HOSPITAL | Age: 44
End: 2024-02-23
Payer: MEDICAID

## 2024-02-23 ENCOUNTER — OFFICE VISIT (OUTPATIENT)
Dept: ORTHOPEDICS | Facility: CLINIC | Age: 44
End: 2024-02-23
Payer: MEDICAID

## 2024-02-23 DIAGNOSIS — G56.91 MONONEURITIS ARM, RIGHT: ICD-10-CM

## 2024-02-23 DIAGNOSIS — M25.511 ACUTE PAIN OF RIGHT SHOULDER: Primary | ICD-10-CM

## 2024-02-23 DIAGNOSIS — M67.911 DISORDER OF RIGHT ROTATOR CUFF: ICD-10-CM

## 2024-02-23 PROCEDURE — 1159F MED LIST DOCD IN RCRD: CPT | Mod: CPTII,,, | Performed by: NURSE PRACTITIONER

## 2024-02-23 PROCEDURE — 1160F RVW MEDS BY RX/DR IN RCRD: CPT | Mod: CPTII,,, | Performed by: NURSE PRACTITIONER

## 2024-02-23 PROCEDURE — 99212 OFFICE O/P EST SF 10 MIN: CPT | Mod: PBBFAC | Performed by: NURSE PRACTITIONER

## 2024-02-23 PROCEDURE — 97530 THERAPEUTIC ACTIVITIES: CPT

## 2024-02-23 PROCEDURE — 99999 PR PBB SHADOW E&M-EST. PATIENT-LVL II: CPT | Mod: PBBFAC,,, | Performed by: NURSE PRACTITIONER

## 2024-02-23 PROCEDURE — 99213 OFFICE O/P EST LOW 20 MIN: CPT | Mod: S$PBB,,, | Performed by: NURSE PRACTITIONER

## 2024-02-23 NOTE — PROGRESS NOTES
Subjective:      Follow up: RT shoulder:     Moreno Berman is a 43 y.o. left handed male who presents for follow up for chronic right shoulder pain and weakness. Previous MRI showed a partial-thickness tear of the supraspinatus tendon without tendon retraction. He presents today and states that he continues to make improvement with the shoulder. He is attending therapy once a week and states that he is making progress with therapy. He states that he continues with night time numbness in the RT UE and is seeing neurology ([facility] [place]). He rates his pain as 0/10 today.      The following portions of the patient's history were reviewed and updated as appropriate: allergies, current medications, past family history, past medical history, past social history, past surgical history, and problem list.      Review of Systems   Constitutional: Negative.  Negative for fever.   Musculoskeletal:  Positive for joint pain.   Skin: Negative.    Neurological: Negative.    Psychiatric/Behavioral: Anxiety        Objective:   NVI  General:  alert, appears stated age, and cooperative   Gait:  Normal.       Right Shoulder  Bruising:   absent   Crepitus:  absent   Joint Tenderness:  lateral acromial, rotator cuff - minimal   Active ROM:   Improved in all planes.   Neer:   positive- mild   [place]  negative   Speeds negative   Cross arm negative   Empty can negative   Drop arm Weakness - minimal   Stability:   normal   Apprehension Sign:   negative   Atrophy:   none noted   Strength:  biceps 5/5, triceps 5/5, abduction 5/5, adduction 5/5      Contralateral shoulder was without deficit.   Brisk refill noted.       Imaging  None today  Assessment:      RT shoulder pain, partial tear supraspinatus  RT upper extremity weakness- mononeuritis     Plan:      He is again making clinical progress with OT with ROM and strengthening as previous.  He will follow up with neurology for continued RT UE mononeuritis.   Continue Home physician directed RT  shoulder HEP and OT for modalities as directed. for the RT UE  Mobic as previous.   Ice and over head rest as directed.  RTC 6 weeks for follow up, will make his visits prn if he continues to make progress.  He had no further questions.

## 2024-02-23 NOTE — PROGRESS NOTES
"  Occupational Therapy Daily Treatment Note     Date: 2/23/2024  Name: Moreno Berman  MRN: [id number]    Diagnosis:   Encounter Diagnosis   Name Primary?    Acute pain of right shoulder Yes       Referring Physician: Akin Adams NP    Evaluation Date: 10/24/2023  POC Cert dates: 1/19/2024 - 3/1/2024    Visit # / Visits authorized: 5 / 25  GALAVIZ visit number: 0  Time In: 9:30 AM  Time Out: 10:00 AM   Total Billable Time: 30 min    Precautions:  Standard      Subjective     Pt reports: "My wrist is really hurting me. Hy hand is numb"   he was compliant with home exercise program given last session.     Pain: 9/10  Location: right hand    Objective     Treatment consist of the following:     Moreno received the following supervised modalities after being cleared for contradictions:   - CP to decrease pain and soreness post session. (Not today)    Moreno participated in dynamic functional therapeutic activities to improve functional performance, including:    -Patient performed bilateral omnicycle with 10 res exercise for 2 sets x 5 min with min rest breaks to facilitate an increase in strength, functional mobility, range of motion, and endurance for increased indep, cardiopulmonary functions, and performance with activities of daily living.       Home Exercises and Education Provided     Education provided:   - Role of OT and OT POC  - Progress towards goals     Written Home Exercises Provided: Patient instructed to cont prior HEP.  Exercises were reviewed and Moreno was able to demonstrate them prior to the end of the session.  Moreno demonstrated good  understanding of the HEP provided.   .   See EMR under Media for exercises provided 10/24/2023.        Assessment     Patient would continue to benefit from skilled occupational therapy to increase pain free range of motion and decrease numbness and tingling. Due to hand pain, patient downgraded and ended early as a result. Pt reported his shoulder has been doing good " its more of his right hand. Patient requested to end session early. Occupational therapist /GALAVIZ collaboration to discuss POC, improvements, and d/c planning on todays date.      Moreno is progressing well towards his goals and there are no updates to goals at this time. Pt prognosis is Good.     Pt will continue to benefit from skilled OT intervention.  Patient continues to demonstrate limitation  with  ROM, Joint mobility, Stiffness, Decreased functional use, Impaired sensation, Decreased strength, and Continued pain     Goals:  New goals to be met in 6 weeks:  1) Pt will be independent and report performing HEP to maximize (R) shoulder functional capacity.    2) Pt will increase shoulder strength in all measured planes of motion to 4/5 with daily task.   3) Pt will report increased independence in ADLs.  4) Pt will report one degree less of pain with nonuse.   6) Pt will increase shoulder AROM in all measured planes of motion by 5-10 degrees with daily task.     Plan     POC:  Moreno to be treated by Occupational Therapy 1 times per week for 6 weeks to achieve the established goals.   Treatment to include Ultrasoud @ 3mHz, AAROM Mobilization of GH joint, PROM Home program, Ice, Moist heat, Strengthening Theraband Ex, UBE , and E- stim as well as any other treatments deemed necessary based on the patient's needs or progress.      Certification Dates: 1/19/2024 - 3/1/2024    Updates/Grading for next session: N/A      Curtis Bravo OT

## 2024-03-01 ENCOUNTER — CLINICAL SUPPORT (OUTPATIENT)
Dept: REHABILITATION | Facility: HOSPITAL | Age: 44
End: 2024-03-01
Payer: MEDICAID

## 2024-03-01 DIAGNOSIS — M25.511 ACUTE PAIN OF RIGHT SHOULDER: Primary | ICD-10-CM

## 2024-03-01 PROCEDURE — 97530 THERAPEUTIC ACTIVITIES: CPT

## 2024-03-01 NOTE — PROGRESS NOTES
"  Occupational Therapy Daily Treatment/ Discharge Note     Date: 3/1/2024  Name: Moreno Berman  MRN: [id number]    Diagnosis:   Encounter Diagnosis   Name Primary?    Acute pain of right shoulder Yes     Referring Physician: Akin Adams NP    Evaluation Date: 10/24/2023  POC Cert dates: 1/19/2024 - 3/1/2024    Visit # / Visits authorized: 6 / 25  GALAVIZ visit number: 0  Time In: 9:57 AM  Time Out: 10:57 AM   Total Billable Time: 57 min    Precautions:  Standard      Subjective     Pt reports: "Im ok just with numbness in my hand"   he was compliant with home exercise program given last session.     Pain: 2/10  Location: right hand    Objective     New measurements in Green    Range of Motion:   Right: limited as follows: (see measurements table below)  Left: WNL       (R) UE       AROM Norm       0-180   Shoulder Flexion 80 120 140 165 180   Shoulder Abduction 85 110 115 165 0-180   Shoulder Extension 45  45 50 0-50   Shoulder Internal Rotation 75 85 85 90 0-90   Shoulder External Rotation 75  85  85 90 0-90      ROM Comments:   Pain at end range        Strength  Shoulder Flexion RUE: 4/5   Shoulder Extension RUE: 4/5   Shoulder Abduction RUE:  4/5   Shoulder Adduction RUE:  4/5   Internal Rotation RUE: 4/5   External Rotation RUE: 4/5   Horizontal Adduction RUE: 4/5   Shoulder Flexion LUE: 5/5   Shoulder Extension LUE: 5/5   Shoulder Abduction LUE: 5/5   Shoulder Abbduction LUE: 5/5   Internal Rotation LUE:  5/5   External Rotation LUE:  5/5   Horizontal Adduction LUE:  5/5       Palpation: (for pain)     Positive: Supraspinatus Region     Limitations of Functional Status:   Self Care: requires increase time to don clothes, mopping, sweeping, and reaching overhead  Work: lifting, pushing  Leisure: driving     FOTO: 65 [id number]    Treatment consist of the following:     Moreno received the following supervised modalities after being cleared for contradictions:   - CP to decrease pain and soreness post session. (Not " today)    Moreno participated in dynamic functional therapeutic activities to improve functional performance, including:    -Patient performed bilateral omnicycle with 10 res exercise for 2 sets x 5 min with min rest breaks to facilitate an increase in strength, functional mobility, range of motion, and endurance for increased indep, cardiopulmonary functions, and performance with activities of daily living.       Home Exercises and Education Provided     Education provided:   - Role of OT and OT POC  - Progress towards goals     Written Home Exercises Provided: Patient instructed to cont prior HEP.  Exercises were reviewed and Moreno was able to demonstrate them prior to the end of the session.  Moreno demonstrated good  understanding of the HEP provided.   .   See EMR under Media for exercises provided 10/24/2023.        Assessment     Pt reported his shoulder has been doing good its more of his right hand. Patient objective measurements taken this day and goals updated below. Pt has met all goals and is discharged from skilled OT. Patient tolerated all activities well this day with no complaints of increased pain. Occupational therapist /GALAVIZ collaboration to discuss POC, improvements, and d/c planning on todays date.      Goals:  New goals to be met in 6 weeks:  1) Pt will be independent and report performing HEP to maximize (R) shoulder functional capacity.  MET  2) Pt will increase shoulder strength in all measured planes of motion to 4/5 with daily task. MET  3) Pt will report increased independence in ADLs. MET  4) Pt will report one degree less of pain with nonuse. MET  6) Pt will increase shoulder AROM in all measured planes of motion by 5-10 degrees with daily task. MET    Plan     POC:  Moreno to be discharged from Occupational Therapy as patient has achieved the established goals.     Updates/Grading for next session: N/A      Curtis Bravo OT

## 2024-04-05 ENCOUNTER — OFFICE VISIT (OUTPATIENT)
Dept: ORTHOPEDICS | Facility: CLINIC | Age: 44
End: 2024-04-05
Payer: MEDICAID

## 2024-04-05 DIAGNOSIS — G56.91 MONONEURITIS ARM, RIGHT: ICD-10-CM

## 2024-04-05 DIAGNOSIS — M67.911 DISORDER OF RIGHT ROTATOR CUFF: ICD-10-CM

## 2024-04-05 DIAGNOSIS — M25.511 ACUTE PAIN OF RIGHT SHOULDER: Primary | ICD-10-CM

## 2024-04-05 PROCEDURE — 99999 PR PBB SHADOW E&M-EST. PATIENT-LVL II: CPT | Mod: PBBFAC,,, | Performed by: NURSE PRACTITIONER

## 2024-04-05 PROCEDURE — 99213 OFFICE O/P EST LOW 20 MIN: CPT | Mod: S$PBB,,, | Performed by: NURSE PRACTITIONER

## 2024-04-05 PROCEDURE — 99212 OFFICE O/P EST SF 10 MIN: CPT | Mod: PBBFAC | Performed by: NURSE PRACTITIONER

## 2024-04-05 PROCEDURE — 1159F MED LIST DOCD IN RCRD: CPT | Mod: CPTII,,, | Performed by: NURSE PRACTITIONER

## 2024-04-05 PROCEDURE — 1160F RVW MEDS BY RX/DR IN RCRD: CPT | Mod: CPTII,,, | Performed by: NURSE PRACTITIONER

## 2024-04-05 NOTE — PROGRESS NOTES
Subjective:      Follow up: RT shoulder:     Moreno Berman is a 43 y.o. left handed male who presents for follow up for chronic right shoulder pain and weakness. Previous MRI showed a partial-thickness tear of the supraspinatus tendon without tendon retraction. He presents today and states that his ROM has improved some but continues with persistent weakness and numbness. He is attending therapy as previous. [place] He states that he was started on gabapentin recently. He rates his pain as 0/10 today.      The following portions of the patient's history were reviewed and updated as appropriate: allergies, current medications, past family history, past medical history, past social history, past surgical history, and problem list.      Review of Systems   Constitutional: Negative.  Negative for fever.   Musculoskeletal:  Positive for joint pain.   Skin: Negative.    Neurological: Negative.    Psychiatric/Behavioral: Anxiety        Objective:   NVI  General:  alert, appears stated age, and cooperative   Gait:  Normal.       Right Shoulder  Bruising:   absent   Crepitus:  absent   Joint Tenderness:  lateral acromial, rotator cuff - minimal   Active ROM:   Unchanged, not end range AROM   Neer:   positive- mild   [place]  negative   Speeds negative   Cross arm negative   Empty can negative   Drop arm Weakness - mild   Stability:   normal   Apprehension Sign:   negative   Atrophy:   none noted   Strength:  biceps 5/5, triceps 5/5, abduction 4/5, adduction 4/5      Contralateral shoulder was without deficit.   Brisk refill noted.       Imaging  None today  Assessment:      RT shoulder pain, partial tear supraspinatus  RT upper extremity weakness- mononeuritis     Plan:      He is doing about the same. He has made some gains with PROM. He continues with RT UE weakness and persistent numbness. He is currently seeing neurology. He states that he has an upcoming appointment next week.    Continue Home physician directed RT shoulder HEP and OT for modalities as directed. for the RT UE  Mobic as previous.   Ice and over head rest as directed.  RTC 3-4 weeks for follow up, will get notes from [facility] to determine if he needs to see a spine specialist.   He had no further questions.

## 2024-04-26 ENCOUNTER — OFFICE VISIT (OUTPATIENT)
Dept: ORTHOPEDICS | Facility: CLINIC | Age: 44
End: 2024-04-26
Payer: MEDICAID

## 2024-04-26 DIAGNOSIS — G56.03 BILATERAL CARPAL TUNNEL SYNDROME: ICD-10-CM

## 2024-04-26 DIAGNOSIS — M67.911 DISORDER OF RIGHT ROTATOR CUFF: ICD-10-CM

## 2024-04-26 DIAGNOSIS — M25.511 ACUTE PAIN OF RIGHT SHOULDER: Primary | ICD-10-CM

## 2024-04-26 DIAGNOSIS — G56.91 MONONEURITIS ARM, RIGHT: ICD-10-CM

## 2024-04-26 DIAGNOSIS — R29.898 WEAKNESS OF SHOULDER: ICD-10-CM

## 2024-04-26 PROCEDURE — 99213 OFFICE O/P EST LOW 20 MIN: CPT | Mod: S$PBB,,, | Performed by: NURSE PRACTITIONER

## 2024-04-26 PROCEDURE — 1160F RVW MEDS BY RX/DR IN RCRD: CPT | Mod: CPTII,,, | Performed by: NURSE PRACTITIONER

## 2024-04-26 PROCEDURE — 99999 PR PBB SHADOW E&M-EST. PATIENT-LVL III: CPT | Mod: PBBFAC,,, | Performed by: NURSE PRACTITIONER

## 2024-04-26 PROCEDURE — 99213 OFFICE O/P EST LOW 20 MIN: CPT | Mod: PBBFAC | Performed by: NURSE PRACTITIONER

## 2024-04-26 PROCEDURE — 1159F MED LIST DOCD IN RCRD: CPT | Mod: CPTII,,, | Performed by: NURSE PRACTITIONER

## 2024-04-26 NOTE — PROGRESS NOTES
Subjective:      Follow up: RT shoulder:     Moreno Berman is a 43 y.o. left handed male who presents for follow up for chronic right shoulder pain, extremity weakness and numbness. Previous MRI showed a partial-thickness tear of the supraspinatus tendon without tendon retraction. He presents today and states that his ROM again has improved. He continues with persistent weakness and numbness in the upper arm and mostly affects his hand. He reports grasp weakness. He is currently seeing neurology ([facility] [place] He rates his pain as 0/10 today in the shoulder.      The following portions of the patient's history were reviewed and updated as appropriate: allergies, current medications, past family history, past medical history, past social history, past surgical history, and problem list.      Review of Systems   Constitutional: Negative.  Negative for fever.   Musculoskeletal:  Positive for joint pain.   Skin: Negative.    Neurological: Negative.    Psychiatric/Behavioral: Anxiety        Objective:   NVI  General:  alert, appears stated age, and cooperative   Gait:  Normal.       Right Shoulder  Bruising:   absent   Crepitus:  absent   Joint Tenderness:  lateral acromial, rotator cuff - minimal   Active ROM:   Unchanged, not end range AROM   Neer:   positive- mild   [place]  negative   Speeds negative   Cross arm negative   Empty can negative   Drop arm Weakness - mild   Stability:   normal   Apprehension Sign:   negative   Atrophy:   none noted   Strength:  biceps 5/5, triceps 5/5, abduction 4/5, adduction 4/5      Contralateral shoulder was without deficit.   Brisk refill noted.       Imaging  None today    EMG reviewed(done 01/09/24) from [facility] of BUE:  Bilateral C5 and C6 radicular disease.   RT greater than LT median neuropathies at the wrists and ulnar neuropathies at or above the elbows    Assessment:      RT shoulder pain, partial tear supraspinatus  RT upper extremity weakness- mononeuritis  RT CTS     Plan:       He is showing slow steady improvement with decreased right shoulder pain and has again made gains with AROM from OT. He continues with RT UE weakness and persistent numbness in the right hand and first three fingers. He had a previous EMG done showing carpal tunnel (B/L) and has had carpal tunnel injections done (B/L) at [facility] [place] with Dr. James with mild relief. Referral to [facility] to evaluate for carpal tunnel release.   Continue Home physician directed RT shoulder HEP and OT for modalities as directed. for the RT UE.  Nsaid as previous.   Ice and over head rest as directed.  RTC prn for the shoulder.   He had no further questions.

## 2024-05-30 ENCOUNTER — TELEPHONE (OUTPATIENT)
Dept: ORTHOPEDICS | Facility: CLINIC | Age: 44
End: 2024-05-30
Payer: MEDICAID

## 2024-05-30 DIAGNOSIS — R52 PAIN: Primary | ICD-10-CM

## 2024-06-26 ENCOUNTER — HOSPITAL ENCOUNTER (OUTPATIENT)
Dept: RADIOLOGY | Facility: HOSPITAL | Age: 44
Discharge: HOME | End: 2024-06-26
Attending: ORTHOPAEDIC SURGERY
Payer: MEDICAID

## 2024-06-26 DIAGNOSIS — R52 PAIN: ICD-10-CM

## 2024-06-26 PROCEDURE — 73130 X-RAY EXAM OF HAND: CPT | Mod: 26,50,, | Performed by: RADIOLOGY

## 2024-06-26 PROCEDURE — 73130 X-RAY EXAM OF HAND: CPT | Mod: TC,50

## 2024-07-01 PROBLEM — M79.641 RIGHT HAND PAIN: Status: ACTIVE | Noted: 2024-07-01

## 2024-07-09 PROBLEM — G56.01 RIGHT CARPAL TUNNEL SYNDROME: Status: ACTIVE | Noted: 2024-07-09

## 2024-07-18 PROBLEM — Z98.890 POSTOPERATIVE STATE: Status: ACTIVE | Noted: 2024-07-18

## 2024-08-03 PROBLEM — Z98.890 S/P CARPAL TUNNEL RELEASE: Status: ACTIVE | Noted: 2024-08-03

## 2025-04-15 ENCOUNTER — HOSPITAL ENCOUNTER (OUTPATIENT)
Dept: RADIOLOGY | Facility: HOSPITAL | Age: 45
Discharge: HOME | End: 2025-04-15
Attending: NURSE PRACTITIONER
Payer: MEDICAID

## 2025-04-15 DIAGNOSIS — M25.522 PAIN IN LEFT ELBOW: Primary | ICD-10-CM

## 2025-04-15 DIAGNOSIS — M25.522 PAIN IN LEFT ELBOW: ICD-10-CM

## 2025-04-15 PROCEDURE — 73080 X-RAY EXAM OF ELBOW: CPT | Mod: TC,LT
